# Patient Record
Sex: FEMALE | Race: BLACK OR AFRICAN AMERICAN | NOT HISPANIC OR LATINO | Employment: UNEMPLOYED | URBAN - METROPOLITAN AREA
[De-identification: names, ages, dates, MRNs, and addresses within clinical notes are randomized per-mention and may not be internally consistent; named-entity substitution may affect disease eponyms.]

---
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
## 2024-01-02 NOTE — PATIENT INSTRUCTIONS
If you have any questions regarding your visit, Please contact your care team.     [facility]: [phone]  To Schedule an Appointment 24/7  Call: [phone]     Yvette Zendejas- APRN CNP      [facility]-Surgery Scheduler  [facility]-Surgery Scheduler         Monday 7:30am-2:00pm    Tuesday 7:30am-4:00pm    Wednesday 7:30am-2:00pm    Thursday 7:30am-11:00am    Friday 7:30am-2:00pm [address] [address]  Phone- [phone]   Fax- [phone]     Imaging Scheduling all locations  [phone]    [facility] Labor and Delivery  [address] [place] [address]   [place] [address] [phone]         Urgent Care locations:  [facility]   Monday-Friday  10 am - 8 pm  Saturday and Sunday   9 am - 5 pm     [phone]   If you need a medication refill, please contact your pharmacy. Please allow 3 business days for your refill to be completed.  As always, Thank you for trusting us with your healthcare needs!      see additional instructions from your care team below

## 2024-01-03 ENCOUNTER — OFFICE VISIT (OUTPATIENT)
Dept: OBGYN | Facility: CLINIC | Age: 19
End: 2024-01-03
Payer: COMMERCIAL

## 2024-01-03 VITALS
DIASTOLIC BLOOD PRESSURE: 78 MMHG | SYSTOLIC BLOOD PRESSURE: 123 MMHG | OXYGEN SATURATION: 96 % | HEART RATE: 82 BPM | WEIGHT: 169.2 LBS | BODY MASS INDEX: 28.16 KG/M2

## 2024-01-03 DIAGNOSIS — N89.8 VAGINAL DISCHARGE: Primary | ICD-10-CM

## 2024-01-03 DIAGNOSIS — B96.89 BV (BACTERIAL VAGINOSIS): ICD-10-CM

## 2024-01-03 DIAGNOSIS — N76.0 BV (BACTERIAL VAGINOSIS): ICD-10-CM

## 2024-01-03 DIAGNOSIS — Z30.011 ENCOUNTER FOR INITIAL PRESCRIPTION OF CONTRACEPTIVE PILLS: ICD-10-CM

## 2024-01-03 DIAGNOSIS — B00.9 HSV INFECTION: ICD-10-CM

## 2024-01-03 DIAGNOSIS — Z11.3 SCREEN FOR STD (SEXUALLY TRANSMITTED DISEASE): ICD-10-CM

## 2024-01-03 LAB
C TRACH DNA SPEC QL NAA+PROBE: NEGATIVE
CLUE CELLS: PRESENT
HBV SURFACE AG SERPL QL IA: NONREACTIVE
HCV AB SERPL QL IA: NONREACTIVE
HIV 1+2 AB+HIV1 P24 AG SERPL QL IA: NONREACTIVE
N GONORRHOEA DNA SPEC QL NAA+PROBE: NEGATIVE
T PALLIDUM AB SER QL: NONREACTIVE
TRICHOMONAS, WET PREP: ABNORMAL
WBC'S/HIGH POWER FIELD, WET PREP: ABNORMAL
YEAST, WET PREP: ABNORMAL

## 2024-01-03 PROCEDURE — 87340 HEPATITIS B SURFACE AG IA: CPT | Performed by: NURSE PRACTITIONER

## 2024-01-03 PROCEDURE — 86803 HEPATITIS C AB TEST: CPT | Performed by: NURSE PRACTITIONER

## 2024-01-03 PROCEDURE — 87210 SMEAR WET MOUNT SALINE/INK: CPT | Performed by: NURSE PRACTITIONER

## 2024-01-03 PROCEDURE — 36415 COLL VENOUS BLD VENIPUNCTURE: CPT | Performed by: NURSE PRACTITIONER

## 2024-01-03 PROCEDURE — 87491 CHLMYD TRACH DNA AMP PROBE: CPT | Performed by: NURSE PRACTITIONER

## 2024-01-03 PROCEDURE — 87591 N.GONORRHOEAE DNA AMP PROB: CPT | Performed by: NURSE PRACTITIONER

## 2024-01-03 PROCEDURE — 86780 TREPONEMA PALLIDUM: CPT | Performed by: NURSE PRACTITIONER

## 2024-01-03 PROCEDURE — 99214 OFFICE O/P EST MOD 30 MIN: CPT | Performed by: NURSE PRACTITIONER

## 2024-01-03 PROCEDURE — 87389 HIV-1 AG W/HIV-1&-2 AB AG IA: CPT | Performed by: NURSE PRACTITIONER

## 2024-01-03 RX ORDER — VALACYCLOVIR HYDROCHLORIDE 1 G/1
2000 TABLET, FILM COATED ORAL 2 TIMES DAILY PRN
Qty: 20 TABLET | Refills: 0 | Status: SHIPPED | OUTPATIENT
Start: 2024-01-03 | End: 2024-02-27

## 2024-01-03 RX ORDER — DESOGESTREL AND ETHINYL ESTRADIOL 0.15-0.03
1 KIT ORAL DAILY
Qty: 84 TABLET | Refills: 3 | Status: SHIPPED | OUTPATIENT
Start: 2024-01-03 | End: 2024-05-06 | Stop reason: SINTOL

## 2024-01-03 RX ORDER — METRONIDAZOLE 7.5 MG/G
1 GEL VAGINAL AT BEDTIME
Qty: 70 G | Refills: 0 | Status: SHIPPED | OUTPATIENT
Start: 2024-01-03 | End: 2024-01-08

## 2024-01-03 NOTE — PROGRESS NOTES
Assessment & Plan     Vaginal discharge  - Chlamydia trachomatis PCR  - Neisseria gonorrhoeae PCR  - Wet preparation    Screen for STD (sexually transmitted disease)  Await results and will treat as indicated.   - Chlamydia trachomatis PCR  - Neisseria gonorrhoeae PCR  - Hepatitis B surface antigen; Future  - Hepatitis C Screen Reflex to HCV RNA Quant and Genotype; Future  - Treponema Abs w Reflex to RPR and Titer; Future  - HIV Antigen Antibody Combo Cascade; Future  - Hepatitis B surface antigen  - Hepatitis C Screen Reflex to HCV RNA Quant and Genotype  - Treponema Abs w Reflex to RPR and Titer  - HIV Antigen Antibody Combo Cascade    Encounter for initial prescription of contraceptive pills  Discussed options for contraception with patient including oral contraceptive pills, transdermal patches, vaginal ring, Depo Provera, Nexplanon, IUD. At this time she would like to try an oral contraceptive pill. We discussed when to start the pill, taking it at the same time every day, possible side effects she may experience, and use of barrier method to protect against STDs.   - desogestrel-ethinyl estradiol (APRI) 0.15-30 MG-MCG tablet; Take 1 tablet by mouth daily    HSV infection  Reviewed dosing instructions for the Valtrex for oral outbreaks, patient did not read and took all 20 tablets. We discussed use, also reviewed outbreak symptoms. Will send new prescription and she verbalizes understanding of dosing instructions for outbreaks.  - valACYclovir (VALTREX) 1000 mg tablet; Take 2 tablets (2,000 mg) by mouth 2 times daily as needed (oral outbreaks PRN for 1 day)    BV (bacterial vaginosis)  Preferred MetroGel for treatment, discussed use. Recommend no intercourse for 7-10 days. If right lower quadrant pain persists after treatment, to send message and I will order a pelvic ultrasound. Patient is given an opportunity to ask questions and have them answered.  - metroNIDAZOLE (METROGEL) 0.75 % vaginal gel; Place 1  applicator (5 g) vaginally at bedtime for 5 days  {  PAMELLA Nguyen CNP  [facility] DAVID Ramos is a 18 year old, presenting for the following health issues:  Vaginal Problem      HPI     Presents today with several concerns. Patient was seen by OB in September for right lower quadrant pelvic pain, vaginal discharge. STI screening was positive for HSV 1 and 2, also BV and patient was treated. Plan was to consider pelvic ultrasound if symptoms persisted. Since then right lower quadrant pain has been intermittent. Can have long periods of time with no symptoms, then gets a sudden striking pain. Lasts a few minutes and spontaneously resolves. No notable pattern, no specific aggravating factors. Does not occur daily, can occur more than once in a day when it does happen. Notes increase recently in vaginal discharge, odor and irritation. No itching, abnormal urinary symptoms.  Denies fever, nausea, dizziness. Some decrease in appetite.   Patient was diagnosed in September with HSV type 1 and 2 by lab work. Since then, feels she has had several oral outbreaks-describes as cracks in the corner of her mouth, no noted lesion. Patient was given prescription for herpes labialis and is requesting refill-though was given enough tablets for several outbreaks.   She would like to discuss contraception at this time as well. Had been open to a pregnancy if it occurred, but would like to discuss options at this time, has not used contraception previously. Cycles can be a little irregular since her termination in 2022, last one was later in the month instead of earlier. No contraindications to use of hormonal medication.     Review of Systems   Constitutional, HEENT, cardiovascular, pulmonary, gi and gu systems are negative, except as otherwise noted.      Objective    /78 (BP Location: Right arm, Patient Position: Chair, Cuff Size: Adult Regular)   Pulse 82   Wt 76.7 kg (169 lb  3.2 oz)   LMP 12/20/2023 (Exact Date)   SpO2 96%   BMI 28.16 kg/m    Body mass index is 28.16 kg/m .  Physical Exam   GENERAL: healthy, alert and no distress  ABDOMEN: soft, nontender, without hepatosplenomegaly or masses. No guarding, rebound tenderness or rigidity   (female): normal female external genitalia, normal urethral meatus , vaginal mucosa pink, moist, well rugated, vaginal discharge - copious and yellow, and normal cervix, adnexae, and uterus without masses.  MS: no gross musculoskeletal defects noted, no edema  SKIN: no suspicious lesions or rashes  PSYCH: mentation appears normal, affect normal/bright    Results for orders placed or performed in visit on 01/03/24 (from the past 24 hour(s))   Wet preparation    Specimen: Vagina; Swab   Result Value Ref Range    Trichomonas Absent Absent    Yeast Absent Absent    Clue Cells Present (A) Absent    WBCs/high power field 1+ (A) None

## 2024-01-24 ENCOUNTER — MYC MEDICAL ADVICE (OUTPATIENT)
Dept: FAMILY MEDICINE | Facility: CLINIC | Age: 19
End: 2024-01-24
Payer: COMMERCIAL

## 2024-01-24 ENCOUNTER — MYC REFILL (OUTPATIENT)
Dept: OBGYN | Facility: CLINIC | Age: 19
End: 2024-01-24
Payer: COMMERCIAL

## 2024-01-24 DIAGNOSIS — B00.9 HSV INFECTION: ICD-10-CM

## 2024-01-24 RX ORDER — VALACYCLOVIR HYDROCHLORIDE 500 MG/1
500 TABLET, FILM COATED ORAL DAILY
Qty: 90 TABLET | Refills: 3 | OUTPATIENT
Start: 2024-01-24

## 2024-01-24 NOTE — TELEPHONE ENCOUNTER
"Requested Prescriptions   Pending Prescriptions Disp Refills    valACYclovir (VALTREX) 500 MG tablet 90 tablet 3     Sig: Take 1 tablet (500 mg) by mouth daily .       Antivirals for Herpes Protocol Failed - 1/24/2024  5:48 AM        Failed - Normal serum creatinine on file in past 12 months     Recent Labs   Lab Test 05/02/21  1240   CR 0.87       Ok to refill medication if creatinine is low          Passed - Patient is age 12 or older        Passed - Recent (12 mo) or future (30 days) visit within the authorizing provider's specialty     The patient must have completed an in-person or virtual visit within the past 12 months or has a future visit scheduled within the next 90 days with the authorizing provider s specialty.  Urgent care and e-visits do not quality as an office visit for this protocol.          Passed - Medication is active on med list           Last Written Prescription Date:  11/14/23  Last Fill Quantity: 90,  # refills: 3   Last office visit: 9/12/2023 ; last virtual visit: Visit date not found with prescribing provider:  Phong   Future Office Visit:  NONE    Refill request refused due to :   Refills available at pharmacy.  Request sent back to pharmacy as \"duplicate\"    Kelli Gamboa RN on 1/24/2024 at 5:55 AM              "

## 2024-01-26 RX ORDER — VALACYCLOVIR HYDROCHLORIDE 500 MG/1
500 TABLET, FILM COATED ORAL DAILY
Qty: 90 TABLET | Refills: 3 | Status: CANCELLED | OUTPATIENT
Start: 2024-01-26

## 2024-01-28 ENCOUNTER — MYC REFILL (OUTPATIENT)
Dept: OBGYN | Facility: CLINIC | Age: 19
End: 2024-01-28
Payer: COMMERCIAL

## 2024-01-28 DIAGNOSIS — B00.9 HSV INFECTION: ICD-10-CM

## 2024-01-29 RX ORDER — VALACYCLOVIR HYDROCHLORIDE 500 MG/1
500 TABLET, FILM COATED ORAL DAILY
Qty: 90 TABLET | Refills: 3 | OUTPATIENT
Start: 2024-01-29

## 2024-01-29 NOTE — TELEPHONE ENCOUNTER
"Requested Prescriptions   Pending Prescriptions Disp Refills    valACYclovir (VALTREX) 500 MG tablet 90 tablet 3     Sig: Take 1 tablet (500 mg) by mouth daily .       Antivirals for Herpes Protocol Failed - 1/28/2024  4:26 PM        Failed - Normal serum creatinine on file in past 12 months     Recent Labs   Lab Test 05/02/21  1240   CR 0.87       Ok to refill medication if creatinine is low          Passed - Patient is age 12 or older        Passed - Recent (12 mo) or future (30 days) visit within the authorizing provider's specialty     The patient must have completed an in-person or virtual visit within the past 12 months or has a future visit scheduled within the next 90 days with the authorizing provider s specialty.  Urgent care and e-visits do not quality as an office visit for this protocol.          Passed - Medication is active on med list           Last Written Prescription Date:  11/14/23  Last Fill Quantity: 90,  # refills: 3   Last office visit: 9/12/2023 ; last virtual visit: Visit date not found with prescribing provider:  Phong   Future Office Visit:  NONE    Refill request refused due to :   Refills available at pharmacy.  Request sent back to pharmacy as \"duplicate\"  Kelli Gamboa RN on 1/29/2024 at 6:38 AM            "

## 2024-02-09 ENCOUNTER — MYC REFILL (OUTPATIENT)
Dept: OBGYN | Facility: CLINIC | Age: 19
End: 2024-02-09
Payer: COMMERCIAL

## 2024-02-09 DIAGNOSIS — B00.9 HSV INFECTION: ICD-10-CM

## 2024-02-09 RX ORDER — VALACYCLOVIR HYDROCHLORIDE 500 MG/1
500 TABLET, FILM COATED ORAL DAILY
Qty: 90 TABLET | Refills: 3 | Status: CANCELLED | OUTPATIENT
Start: 2024-02-09

## 2024-02-09 NOTE — TELEPHONE ENCOUNTER
Requested Prescriptions   Pending Prescriptions Disp Refills    valACYclovir (VALTREX) 500 MG tablet 90 tablet 3     Sig: Take 1 tablet (500 mg) by mouth daily .       Antivirals for Herpes Protocol Failed - 2/9/2024 12:33 PM        Failed - Normal serum creatinine on file in past 12 months     Recent Labs   Lab Test 05/02/21  1240   CR 0.87       Ok to refill medication if creatinine is low          Passed - Patient is age 12 or older        Passed - Recent (12 mo) or future (30 days) visit within the authorizing provider's specialty     The patient must have completed an in-person or virtual visit within the past 12 months or has a future visit scheduled within the next 90 days with the authorizing provider s specialty.  Urgent care and e-visits do not quality as an office visit for this protocol.          Passed - Medication is active on med list           Last Written Prescription Date:  11/14/23  Last Fill Quantity: 90,  # refills: 3   Last office visit: 9/12/2023 ; last virtual visit: Visit date not found with prescribing provider:  Radha Darling NP   Future Office Visit:        Refused as duplicate    Radha Herman RN on 2/9/2024 at 12:51 PM

## 2024-02-16 ENCOUNTER — MYC REFILL (OUTPATIENT)
Dept: OBGYN | Facility: CLINIC | Age: 19
End: 2024-02-16
Payer: COMMERCIAL

## 2024-02-16 DIAGNOSIS — B00.9 HSV INFECTION: ICD-10-CM

## 2024-02-19 RX ORDER — VALACYCLOVIR HYDROCHLORIDE 500 MG/1
500 TABLET, FILM COATED ORAL DAILY
Qty: 90 TABLET | Refills: 3 | OUTPATIENT
Start: 2024-02-19

## 2024-02-19 NOTE — TELEPHONE ENCOUNTER
Requested Prescriptions   Pending Prescriptions Disp Refills    valACYclovir (VALTREX) 500 MG tablet 90 tablet 3     Sig: Take 1 tablet (500 mg) by mouth daily .       Antivirals for Herpes Protocol Failed - 2/16/2024  7:58 PM        Failed - Normal serum creatinine on file in past 12 months     Recent Labs   Lab Test 05/02/21  1240   CR 0.87       Ok to refill medication if creatinine is low          Passed - Patient is age 12 or older        Passed - Recent (12 mo) or future (30 days) visit within the authorizing provider's specialty     The patient must have completed an in-person or virtual visit within the past 12 months or has a future visit scheduled within the next 90 days with the authorizing provider s specialty.  Urgent care and e-visits do not quality as an office visit for this protocol.          Passed - Medication is active on med list           Refills available  Adry Gaines RN on 2/19/2024 at 11:11 AM

## 2024-02-20 ENCOUNTER — TELEPHONE (OUTPATIENT)
Dept: FAMILY MEDICINE | Facility: CLINIC | Age: 19
End: 2024-02-20
Payer: COMMERCIAL

## 2024-02-20 DIAGNOSIS — B00.9 HSV INFECTION: ICD-10-CM

## 2024-02-22 NOTE — TELEPHONE ENCOUNTER
500mg has refills on file. 1000mg pended in separate encounter and sent to PCP.     SUE BobbyN, RN

## 2024-02-27 ENCOUNTER — MYC REFILL (OUTPATIENT)
Dept: OBGYN | Facility: CLINIC | Age: 19
End: 2024-02-27
Payer: COMMERCIAL

## 2024-02-27 DIAGNOSIS — B00.9 HSV INFECTION: ICD-10-CM

## 2024-02-27 RX ORDER — VALACYCLOVIR HYDROCHLORIDE 1 G/1
2000 TABLET, FILM COATED ORAL 2 TIMES DAILY PRN
Qty: 20 TABLET | Refills: 0 | Status: SHIPPED | OUTPATIENT
Start: 2024-02-27 | End: 2024-04-10

## 2024-02-27 NOTE — TELEPHONE ENCOUNTER
valACYclovir (VALTREX) 1000 mg tablet was prescribed for pt to take for oral outbreaks PRN for one day on 1/3/24.  Pt was given #20 pills and was advised to take 2 tablets daily BID (4 pills a day).    Pt clarified that she has already gone through #20 tablets since they were prescribed in January.  She states she hasn't been taking the daily Valtrex.  I see that she should have plenty of refills at her pharmacy for the Valtrex 500 mg so I advised that she pick these up and start taking so she doesn't have so many outbreaks.    Routing to Yvette to approve the Valtrex 1000 mg if appropriate.    Regine Gonzalez RN

## 2024-02-27 NOTE — TELEPHONE ENCOUNTER
Refill sent to use for outbreaks. Will need to be seen if she continues to have frequent outbreaks. Yvette CAN CNP

## 2024-04-08 DIAGNOSIS — B00.9 HSV INFECTION: ICD-10-CM

## 2024-04-08 NOTE — TELEPHONE ENCOUNTER
valACYclovir (VALTREX) 1000 mg tablet was prescribed for pt on 2/27/24 to take 2 tablets BID as needed for breakouts (only take PRN for 1 day).      Sending pt a [employer] message to see if she has already had 5 outbreaks since 2/27.    Regine Gonzalez RN

## 2024-04-10 ENCOUNTER — NURSE TRIAGE (OUTPATIENT)
Dept: FAMILY MEDICINE | Facility: CLINIC | Age: 19
End: 2024-04-10
Payer: COMMERCIAL

## 2024-04-10 RX ORDER — VALACYCLOVIR HYDROCHLORIDE 1 G/1
TABLET, FILM COATED ORAL
Qty: 28 TABLET | Refills: 0 | Status: SHIPPED | OUTPATIENT
Start: 2024-04-10 | End: 2024-05-06

## 2024-04-10 NOTE — TELEPHONE ENCOUNTER
Attempted to reach patient to further triage. Left voice message to return call at [phone].    Please try again.    [employer] message also sent.    SUE PlataN RN  [facility]

## 2024-04-10 NOTE — TELEPHONE ENCOUNTER
Pt has not read the [employer] message sent to her.    Spoke with pt to see how she is taking the Valtrex 1000 mg tablets.  She states she is completely out of the #20 pills she was prescribed for oral outbreaks.  She states she has oral and vaginal outbreaks about every 3 weeks.  She is quite confused regarding how she is supposed to take the Valtrex 1000 mg based on if she has an oral outbreak as opposed to having a vaginal outbreak.      Pt is also prescribed Valtrex 500 mg tablets to take daily.  She states she has not taken this in about a month because she is out.  I explained to her that PAMELLA Grant CNP, gave her enough refills to last her until November, 2024, and she needs to contact her pharmacy for more refills.  Pt was quite confused about this as well.  I explained to her that she needs to contact her pharmacy to get a refill of the Valtrex 500 mg and keep taking one pill every day to help prevent outbreaks.  Pt will do this.    Pt is still requesting a refill of the Valtrex 1000 mg as she is currently experiencing an oral outbreak.  She is requesting a larger amount than only 20 as she will be out of town.    Routing to PAMELLA Burrows CNP.    Regine Gonzalez RN

## 2024-04-10 NOTE — TELEPHONE ENCOUNTER
Spoke with pt and let her know that a refill of the Valtrex 1000mg has been sent to her pharmacy.  I explained in a few different ways how to take this medication and that it is only for outbreaks and to only take for one day.  I also encourage her again to fill the Valtrex 500 mg tablets and start taking one pill every day to help suppress her outbreaks.   Pt states that she did contact her pharmacy and filled the Valtrex 500 mg so she will pick those up when she picks up the Valtrex 1000 mg tablets.  She will take the Valtrex 500 mg once daily starting today.  She will also take the Valtrex 28353 mg tablets, 2 pills twice today for a current outbreak.    I also encouraged her to read the instructions on each bottle of pills often to help her remind herself how to take them.    Pt verbalized understanding and agreed to plan.    Regine Gonzalez RN

## 2024-04-10 NOTE — TELEPHONE ENCOUNTER
"Nurse Triage SBAR    Is this a 2nd Level Triage? YES, LICENSED PRACTITIONER REVIEW IS REQUIRED    Situation: patient stated that she is bipolar and is having suicidal thoughts,  she does not have a plan, and has not had any thoughts today. But stated that they can be fleeting thoughts throughout the day.     Background: see above    Assessment:     1. CONCERN: \"What happened that made you call today?\" \"What is your main question or concern about suicide (or depression)?\"      I made an appointment for tomorrow with Cheryl, someone called and said to call them back.      2. RISK OF HARM - SUICIDAL ATTEMPT: \"Have you tried to hurt yourself recently?\" If yes, \"When was this?\"      Denies    3. RISK OF HARM - SUICIDAL IDEATION: In the past week, \"Did you have thoughts of hurting or killing yourself?\" (e.g., yes, no, no but preoccupation with thoughts about death)  - WISH TO BE DEAD: \"Have you ever wished you were dead or wished you could go to sleep and not wake up?\" \"Have you felt that you or your family would be better off if you were dead?\"  - INTENT: \" Have you had any thoughts of hurting or killing yourself? (e.g., yes, no, N/A) If yes: \"Are you having these thoughts about killing yourself right NOW?\"    I do not have these thoughts today,  they can happen all day, or just at random, on certain other days.    - PLAN: If yes: \"Have you thought about how you might do this? Do you have a specific plan in mind?\" (e.g., gun, knife, overdose, hanging, no plan)    I do not have a plan, and would never really hurt myself        4. RISK OF HARM - SUICIDAL BEHAVIOR: \"Have you ever done anything, started to do anything or prepared to do anything to end your life?\" (collected pills, access to a gun, wrote a note, cut yourself, etc)      no  5. FIREARMS: \"Do you have any guns in your home?\"      no  6. ONSET: \"When did the suicidal behavior (or depression) begin?\"      When I was 12 years old, off and on.    7. EVENTS AND " "STRESSORS: \"Has there been any new stress or recent changes in your life?\" (e.g., recent loss of loved one, negative event, etc)      Not really    8. FUNCTIONAL IMPAIRMENT: \"How have things been going for you overall?  Have you had any more difficulties than usual doing your normal daily activities?\" (e.g., better, same, worse; self-care, school, work, interactions)      Not really, I have always felt this way, however it is happening more often.    9. RECURRENT SYMPTOMS: \"Have you ever done this before?\" If so, ask: \"When was the last time?\" and \"What happened that time?\"      Yes, I have not tried to hurt myself, but I feel like I could off and on.  I would never do it though.  I do have bipolar disorder.    10. THERAPIST: \"Do you (or your teen) have a counselor or therapist? Name?\"        Have not seen one in a while.    Advised patient of below:    If you develop active thoughts of self harm and have a plan to harm yourself and means to carry out that plan or you feel that you are in a mental health crisis, please go to the nearest emergency room for immediate care. They have mental health teams that can help you.      RN recommended ER or EMPATH unit for resources while waiting for provider appointment.   ([facility]'s mental health emergency area at [facility] called Em[facility] ([address] [place] [address]  [phone])   Patient verbalizes acknowledgement.         Below are crisis hotline numbers to keep on hand and use if needed.  Here are some support lines you can call if you are having any thoughts of harming yourself.   Suicide and Crisis Lifeline: Call or text 946  National Hotline for Mental Health Crisis and Suicide Prevention 6-596-839-TALK (3827)  [facility] 24-Hour Crisis Counseling (for [facility] Residents Only) [phone]   [facility] 24/7 Suicide Crisis Hotline 180-082-4764   [facility] 24/7 Crisis Intervention Center- Suicide Hotline 747-709-4009, " Crisis Referral Line 634-115-1847      Per protocol:    Patient is to be seen today.    Routed to PCP to advise as level 2 triage.    Protocol Recommended Disposition:   See in Office Today    Recommendation: frank; 2 triage to further advised on disposition    Routed to provider    .    Viry ROGERSN RN  Triage Nurse  [facility]

## 2024-04-10 NOTE — TELEPHONE ENCOUNTER
"Reason for Disposition    Patient reveals suicidal thoughts but no suicidal plans or threats and caller feels patient is safe    Additional Information    Negative: Patient has attempted suicide and has major injuries or serious overdose     Advised to call 911 if these symptoms occur.  Patient stated understanding and agreeable with the plan of care.    Negative: Patient is threatening suicide and has a deadly weapon (e.g., firearm, knife)     Advised to call 911 if these symptoms occur.  Patient stated understanding and agreeable with the plan of care.    Negative: Suicide attempt in progress now and can't be stopped     Advised to call 911 if these symptoms occur.  Patient stated understanding and agreeable with the plan of care.    Negative: Patient is threatening suicide now and unwilling to go to ER or combative    Negative: Seeing, hearing or feeling things that are not there     Advised to call 911 if these symptoms occur.  Patient stated understanding and agreeable with the plan of care.    Negative: Caller expresses suicidal thoughts and hangs up and triager unable to complete triage    Negative: Patient has attempted suicide today and has minor injuries or overdose     Advised to ER if these symptoms occur.  Patient stated understanding and agreeable with the plan of care.    Negative: Patient is threatening suicide now and willing to go to ER    Negative: Patient not threatening suicide now but revealed a suicide plan (eg. overdose, gunshot)    Negative: Patient is extremely upset (e.g. can't be calmed down)    Answer Assessment - Initial Assessment Questions  1. CONCERN: \"What happened that made you call today?\" \"What is your main question or concern about suicide (or depression)?\"      I made an appointment for tomorrow with Cheryl, someone called and said to call them back.      2. RISK OF HARM - SUICIDAL ATTEMPT: \"Have you tried to hurt yourself recently?\" If yes, \"When was this?\"      Denies    3. RISK " "OF HARM - SUICIDAL IDEATION: In the past week, \"Did you have thoughts of hurting or killing yourself?\" (e.g., yes, no, no but preoccupation with thoughts about death)  - WISH TO BE DEAD: \"Have you ever wished you were dead or wished you could go to sleep and not wake up?\" \"Have you felt that you or your family would be better off if you were dead?\"  - INTENT: \" Have you had any thoughts of hurting or killing yourself? (e.g., yes, no, N/A) If yes: \"Are you having these thoughts about killing yourself right NOW?\"    I do not have these thoughts today,  they can happen all day, or just at random, on certain other days.    - PLAN: If yes: \"Have you thought about how you might do this? Do you have a specific plan in mind?\" (e.g., gun, knife, overdose, hanging, no plan)    I do not have a plan, and would never really hurt myself        4. RISK OF HARM - SUICIDAL BEHAVIOR: \"Have you ever done anything, started to do anything or prepared to do anything to end your life?\" (collected pills, access to a gun, wrote a note, cut yourself, etc)      no  5. FIREARMS: \"Do you have any guns in your home?\"      no  6. ONSET: \"When did the suicidal behavior (or depression) begin?\"      When I was 12 years old, off and on.    7. EVENTS AND STRESSORS: \"Has there been any new stress or recent changes in your life?\" (e.g., recent loss of loved one, negative event, etc)      Not really    8. FUNCTIONAL IMPAIRMENT: \"How have things been going for you overall?  Have you had any more difficulties than usual doing your normal daily activities?\" (e.g., better, same, worse; self-care, school, work, interactions)      Not really, I have always felt this way, however it is happening more often.    9. RECURRENT SYMPTOMS: \"Have you ever done this before?\" If so, ask: \"When was the last time?\" and \"What happened that time?\"      Yes, I have not tried to hurt myself, but I feel like I could off and on.  I would never do it though.  I do have bipolar " "disorder.    10. THERAPIST: \"Do you (or your teen) have a counselor or therapist? Name?\"        Have not seen one in a while.    Advised patient of below:    If you develop active thoughts of self harm and have a plan to harm yourself and means to carry out that plan or you feel that you are in a mental health crisis, please go to the nearest emergency room for immediate care. They have mental health teams that can help you.      RN recommended ER or EMPATH unit for resources while waiting for provider appointment.   ([facility]'s mental health emergency area at [facility] called [facility] ([address] [place] [address]  [phone])   Patient verbalizes acknowledgement.         Below are crisis hotline numbers to keep on hand and use if needed.  Here are some support lines you can call if you are having any thoughts of harming yourself.   Suicide and Crisis Lifeline: Call or text 126  [place] Hotline for Mental Health Crisis and Suicide Prevention 0-862-416-TALK (7046)  [facility] 24-Hour Crisis Counseling (for [facility] Residents Only) [phone]   [facility] 24/7 Suicide Crisis Hotline 854-088-8488   [facility] 24/7 Crisis Intervention Center- Suicide Hotline 842-551-2707, Crisis Referral Line [phone]      Per protocol:    Patient is to be seen today.    Routed to PCP to advise as level 2 triage.    Viry ARIZMENDI RN  Triage Nurse  [facility]    Protocols used: Suicide [id number]    "

## 2024-04-10 NOTE — TELEPHONE ENCOUNTER
I would recommend the Empath unit. It patient feels stable until appointment tomorrow ok to keep, however it sounds like she is acutely worsening and needs additional help.   Cheryl Morgan PA-C

## 2024-04-10 NOTE — TELEPHONE ENCOUNTER
Patient with appointment tomorrow, noting suicidal thoughts and not being ok mentally. Please triage patient for safety for this appointment.   Cheryl Morgan PA-C

## 2024-04-10 NOTE — TELEPHONE ENCOUNTER
Additional prescription sent for the PRN use of Valtrex for oral outbreaks. As instructed by RN, should refill and restart daily suppressive treatment as this may also help with the frequency of outbreaks. Yvette CAN CNP

## 2024-04-10 NOTE — TELEPHONE ENCOUNTER
Routing to PCP as FYI    Patient will keep appointment.      RN called and spoke with patient.    RN advised on Empath unit at [facility]    Patient stated she was fine now and did what she needed to do as it took RN time to get back to patient.  Patient stated she is on her way to court currently and court takes a long time.    RN advised patient to keep appointment tomorrow and to call the clinic back if further concerns or changes.    Patient verbalized understanding.    Sixto Jha, RN, BSN, PHN  [facility]

## 2024-04-11 NOTE — TELEPHONE ENCOUNTER
Called patient.  She stated that she was up til 0400 and is dealing with some court issues.  She overslept and missed her appointment today with RONAL Mastesron   Offered Empath again but patient stated that she is feeling fine today.  She would just like to reschedule.    Appointment scheduled with RONAL Masterson on 4/16/2024  Patient agreed to consider Empath if needed    Destiney Felix RN  [facility]

## 2024-04-11 NOTE — TELEPHONE ENCOUNTER
Patient was a no show for her appointment today. Please call and check on patient. If no response please initiate a welfare check.   Cheryl Morgan PA-C

## 2024-04-16 ENCOUNTER — TELEPHONE (OUTPATIENT)
Dept: FAMILY MEDICINE | Facility: CLINIC | Age: 19
End: 2024-04-16

## 2024-04-16 ENCOUNTER — OFFICE VISIT (OUTPATIENT)
Dept: URGENT CARE | Facility: URGENT CARE | Age: 19
End: 2024-04-16
Payer: COMMERCIAL

## 2024-04-16 VITALS
DIASTOLIC BLOOD PRESSURE: 72 MMHG | SYSTOLIC BLOOD PRESSURE: 116 MMHG | RESPIRATION RATE: 12 BRPM | OXYGEN SATURATION: 99 % | TEMPERATURE: 98.7 F | HEART RATE: 82 BPM

## 2024-04-16 DIAGNOSIS — Z11.3 SCREENING EXAMINATION FOR STI: ICD-10-CM

## 2024-04-16 DIAGNOSIS — F32.A DEPRESSION, UNSPECIFIED DEPRESSION TYPE: ICD-10-CM

## 2024-04-16 DIAGNOSIS — B96.89 BACTERIAL VAGINOSIS: Primary | ICD-10-CM

## 2024-04-16 DIAGNOSIS — N76.0 BACTERIAL VAGINOSIS: Primary | ICD-10-CM

## 2024-04-16 LAB
ALBUMIN UR-MCNC: NEGATIVE MG/DL
APPEARANCE UR: ABNORMAL
BACTERIA #/AREA URNS HPF: ABNORMAL /HPF
BILIRUB UR QL STRIP: ABNORMAL
CLUE CELLS: PRESENT
COLOR UR AUTO: YELLOW
GLUCOSE UR STRIP-MCNC: NEGATIVE MG/DL
HGB UR QL STRIP: ABNORMAL
KETONES UR STRIP-MCNC: NEGATIVE MG/DL
LEUKOCYTE ESTERASE UR QL STRIP: NEGATIVE
MUCOUS THREADS #/AREA URNS LPF: PRESENT /LPF
NITRATE UR QL: NEGATIVE
PH UR STRIP: 6 [PH] (ref 5–7)
RBC #/AREA URNS AUTO: ABNORMAL /HPF
SP GR UR STRIP: 1.02 (ref 1–1.03)
SQUAMOUS #/AREA URNS AUTO: ABNORMAL /LPF
T PALLIDUM AB SER QL: NONREACTIVE
TRICHOMONAS, WET PREP: ABNORMAL
UROBILINOGEN UR STRIP-ACNC: 0.2 E.U./DL
WBC #/AREA URNS AUTO: ABNORMAL /HPF
WBC'S/HIGH POWER FIELD, WET PREP: ABNORMAL
YEAST, WET PREP: ABNORMAL

## 2024-04-16 PROCEDURE — 99214 OFFICE O/P EST MOD 30 MIN: CPT | Performed by: PHYSICIAN ASSISTANT

## 2024-04-16 PROCEDURE — 87389 HIV-1 AG W/HIV-1&-2 AB AG IA: CPT | Performed by: PHYSICIAN ASSISTANT

## 2024-04-16 PROCEDURE — 81001 URINALYSIS AUTO W/SCOPE: CPT | Performed by: PHYSICIAN ASSISTANT

## 2024-04-16 PROCEDURE — 86706 HEP B SURFACE ANTIBODY: CPT | Performed by: PHYSICIAN ASSISTANT

## 2024-04-16 PROCEDURE — 87340 HEPATITIS B SURFACE AG IA: CPT | Performed by: PHYSICIAN ASSISTANT

## 2024-04-16 PROCEDURE — 87210 SMEAR WET MOUNT SALINE/INK: CPT | Performed by: PHYSICIAN ASSISTANT

## 2024-04-16 PROCEDURE — 36415 COLL VENOUS BLD VENIPUNCTURE: CPT | Performed by: PHYSICIAN ASSISTANT

## 2024-04-16 PROCEDURE — 86780 TREPONEMA PALLIDUM: CPT | Performed by: PHYSICIAN ASSISTANT

## 2024-04-16 PROCEDURE — 86803 HEPATITIS C AB TEST: CPT | Performed by: PHYSICIAN ASSISTANT

## 2024-04-16 PROCEDURE — 87491 CHLMYD TRACH DNA AMP PROBE: CPT | Performed by: PHYSICIAN ASSISTANT

## 2024-04-16 PROCEDURE — 87591 N.GONORRHOEAE DNA AMP PROB: CPT | Performed by: PHYSICIAN ASSISTANT

## 2024-04-16 RX ORDER — METRONIDAZOLE 500 MG/1
500 TABLET ORAL 2 TIMES DAILY
Qty: 14 TABLET | Refills: 0 | Status: SHIPPED | OUTPATIENT
Start: 2024-04-16 | End: 2024-04-23

## 2024-04-16 NOTE — TELEPHONE ENCOUNTER
Please call and check on patient. She missed her appointment again today and chief complaint continues to be suicidal thoughts.   Cheryl Morgan PA-C

## 2024-04-16 NOTE — TELEPHONE ENCOUNTER
"Called pt and she said, \"If you're calling to do a safety check, I am fine.\" Pt denies thoughts of suicide. No plan. RN advised pt to go to ER if any thoughts of suicide or a plan. Pt verbalized understanding. RN assisted with rescheduling appt with PCP for 4/18.    Deborah Valenzuela RN  [facility]    "

## 2024-04-16 NOTE — PROGRESS NOTES
Patient presents with:  STD: Patient here with concerns of possible STD's. States she is currently having her period but states that prior to it starting she was noticing a discharge. States she is positive for Herpes Simplex 1 and 2 and does take medications for that. States she wants to be tested for any STD's she can today.   Also mentions stye on her right eyelid that is bothering her.     (N76.0,  B96.89) Bacterial vaginosis  (primary encounter diagnosis)  Comment:   Plan: metroNIDAZOLE (FLAGYL) 500 MG tablet            (Z11.3) Screening examination for STI  Comment:   Plan: Chlamydia trachomatis/Neisseria gonorrhoeae by         PCR - Clinic Collect, HIV Antigen Antibody         Combo, Treponema Abs w Reflex to RPR and Titer,        Hepatitis B surface antigen, Hepatitis B         Surface Antibody, Hepatitis C antibody, UA         Macroscopic with reflex to Microscopic and         Culture - Clinic Collect, Wet prep - Clinic         Collect, UA Microscopic with Reflex to Culture            (F32.A) Depression, unspecified depression type  Comment:   Plan: Keep follow up with your primary clinic on 4/18/24.    If you develop active thoughts of self harm and have a plan to harm yourself and means to carry out that plan or you feel that you are in a mental health crisis, please go to the nearest emergency room for immediate care. They have mental health teams that can help you.     Below are crisis hotline numbers to keep on hand and use if needed.  Here are some support lines you can call if you are having any thoughts of harming yourself.     Suicide and Crisis Lifeline: Call or text 579    National Hotline for Mental Health Crisis and Suicide Prevention 1-884-394-TALK (0712)    [facility] 24-Hour Crisis Counseling (for [facility] Residents Only) [phone]     [facility] 24/7 Suicide Crisis Hotline 454-625-6772     [facility] 24/7 Crisis Intervention Center- Suicide Hotline 321-919-8851, Crisis  "Referral Line 065-215-3888         [facility] Empath Program  [phone]     Kind regards,  Your [facility] Care Team    At the end of the encounter, I discussed results, diagnosis, medications. Discussed red flags for immediate return to clinic/ER, as well as indications for follow up if no improvement. Patient understood and agreed to plan. Patient was stable for discharge       41 minutes spent by me on the date of the encounter doing chart review, review of test results, interpretation of tests, patient visit, documentation, and discussion with other provider(s)       SUBJECTIVE:   Rachel Medrano is a 18 year old female who presents today for lower abdominal discomfort, vaginal discharge and request for STI screening.    Patient's chart reveals that she missed an appointment with her primary clinic this morning for follow-up on depression.  I brought this up with the patient, and she states that \"I am feeling fine since I got back from [place], that was how I was feeling before I went there\".    Patient denies any current suicidal ideation.  She does state that she was given the [facility] path telephone number by the nurse line today.    Patient Active Problem List   Diagnosis    Fe deficiency anemia    Head trauma         No past medical history on file.      Current Outpatient Medications   Medication Sig Dispense Refill    Multiple Vitamins-Iron (DAILY-MIGNON/IRON/BETA-CAROTENE) TABS TAKE 1 TABLET BY MOUTH DAILY. (Patient not taking: Reported on 10/19/2020) 30 tablet 7     Social History     Tobacco Use    Smoking status: Never Smoker    Smokeless tobacco: Never Used   Substance Use Topics    Alcohol use: Not on file     Family History   Problem Relation Age of Onset    Diabetes Mother     Diabetes Father          ROS:    10 point ROS of systems including Constitutional, Eyes, Respiratory, Cardiovascular, Gastroenterology, Genitourinary, Integumentary, Muscularskeletal, " Psychiatric ,neurological were all negative except for pertinent positives noted in my HPI       OBJECTIVE:  /72 (BP Location: Left arm, Patient Position: Sitting, Cuff Size: Adult Regular)   Pulse 82   Temp 98.7  F (37.1  C) (Tympanic)   Resp 12   LMP 04/11/2024   SpO2 99%   Physical Exam:  GENERAL APPEARANCE: healthy, alert and no distress  RESP: lungs clear to auscultation - no rales, rhonchi or wheezes  CV: regular rates and rhythm, normal S1 S2, no murmur noted  ABDOMEN:  soft, nontender, no HSM or masses and bowel sounds normal  GU_female: deferred, self wet prep obtained.  SKIN: no suspicious lesions or rashes    Results for orders placed or performed in visit on 04/16/24   UA Macroscopic with reflex to Microscopic and Culture - Clinic Collect     Status: Abnormal    Specimen: Urine, Midstream   Result Value Ref Range    Color Urine Yellow Colorless, Straw, Light Yellow, Yellow    Appearance Urine Slightly Cloudy (A) Clear    Glucose Urine Negative Negative mg/dL    Bilirubin Urine Small (A) Negative    Ketones Urine Negative Negative mg/dL    Specific Gravity Urine 1.025 1.003 - 1.035    Blood Urine Small (A) Negative    pH Urine 6.0 5.0 - 7.0    Protein Albumin Urine Negative Negative mg/dL    Urobilinogen Urine 0.2 0.2, 1.0 E.U./dL    Nitrite Urine Negative Negative    Leukocyte Esterase Urine Negative Negative   UA Microscopic with Reflex to Culture     Status: Abnormal   Result Value Ref Range    Bacteria Urine Moderate (A) None Seen /HPF    RBC Urine 0-2 0-2 /HPF /HPF    WBC Urine 0-5 0-5 /HPF /HPF    Squamous Epithelials Urine Moderate (A) None Seen /LPF    Mucus Urine Present (A) None Seen /LPF    Narrative    Urine Culture not indicated   Wet prep - Clinic Collect     Status: Abnormal    Specimen: Vagina; Swab   Result Value Ref Range    Trichomonas Absent Absent    Yeast Absent Absent    Clue Cells Present (A) Absent    WBCs/high power field 3+ (A) None

## 2024-04-16 NOTE — PATIENT INSTRUCTIONS
(N76.0,  B96.89) Bacterial vaginosis  (primary encounter diagnosis)  Comment:   Plan: metroNIDAZOLE (FLAGYL) 500 MG tablet            (Z11.3) Screening examination for STI  Comment:   Plan: Chlamydia trachomatis/Neisseria gonorrhoeae by         PCR - Clinic Collect, HIV Antigen Antibody         Combo, Treponema Abs w Reflex to RPR and Titer,        Hepatitis B surface antigen, Hepatitis B         Surface Antibody, Hepatitis C antibody, UA         Macroscopic with reflex to Microscopic and         Culture - Clinic Collect, Wet prep - Clinic         Collect, UA Microscopic with Reflex to Culture            (F32.A) Depression, unspecified depression type  Comment:   Plan: Keep follow up with your primary clinic on 4/18/24.    If you develop active thoughts of self harm and have a plan to harm yourself and means to carry out that plan or you feel that you are in a mental health crisis, please go to the nearest emergency room for immediate care. They have mental health teams that can help you.     Below are crisis hotline numbers to keep on hand and use if needed.  Here are some support lines you can call if you are having any thoughts of harming yourself.     Suicide and Crisis Lifeline: Call or text 799    National Hotline for Mental Health Crisis and Suicide Prevention 0-477-549-TALK (1506)    [facility] 24-Hour Crisis Counseling (for [facility] Residents Only) [phone]     [facility] 24/7 Suicide Crisis Hotline [phone]     [facility] ([facility]) 24/7 Crisis Intervention Center- Suicide Hotline 019-502-8125, Crisis Referral Line [phone]         [facility]  [phone]     Kind regards,  [facility] Care Team

## 2024-04-17 LAB
C TRACH DNA SPEC QL PROBE+SIG AMP: NEGATIVE
HBV SURFACE AB SERPL IA-ACNC: 8.05 M[IU]/ML
HBV SURFACE AB SERPL IA-ACNC: NONREACTIVE M[IU]/ML
HBV SURFACE AG SERPL QL IA: NONREACTIVE
HCV AB SERPL QL IA: NONREACTIVE
HIV 1+2 AB+HIV1 P24 AG SERPL QL IA: NONREACTIVE
N GONORRHOEA DNA SPEC QL NAA+PROBE: NEGATIVE

## 2024-04-18 ENCOUNTER — TELEPHONE (OUTPATIENT)
Dept: FAMILY MEDICINE | Facility: CLINIC | Age: 19
End: 2024-04-18

## 2024-04-18 DIAGNOSIS — H53.8 BLURRED VISION: Primary | ICD-10-CM

## 2024-04-18 NOTE — TELEPHONE ENCOUNTER
"Pt calling regarding UC visit on 4/16/24. States she thought she was going to get a medication prescribed for her stye in her right eye, but did not receive a prescription. Pt states her vision has been blurred for 3 weeks and is getting worse. Now stye is moving to her pupil. Has drainage that is clear-yellow in color. Has a \"bubble\" in the corner of her eye. States she has had an \"internal stye\" x 2 weeks and now has a bruise on her eyelid. Has tried warm compresses and this helps for the pain, but then returns 10 times worse. Pt requesting prescription for stye be sent to pharmacy cued.     Pt states she cannot take the pill form of Metronidazole. She vomits from this medication. Pt is requesting Metrogel instead. States she does have Metrogel on hand, but not enough for the course.     Routing to Provider to advise.    Deborah Valenzuela RN  [facility]    "

## 2024-04-21 NOTE — TELEPHONE ENCOUNTER
I call the patient regarding her eyes being blurry, she states that it was blurry because of the eye drop, plus the swelling and redness is gone,but she still wants to see a eye specialist.   She wants us to call and make her an appointment, she is ok if we send the appointment information to her my chart. There is already a referral in, she needs to be seen as soon as possible.   Richard Jose, CASSIDY

## 2024-04-22 NOTE — TELEPHONE ENCOUNTER
Sent [employer] message to patient re: Eye appt @  [facility] # [phone]. She is scheduled 4/23/2024 at the [place] location with Dr. Randhawa.  Teresa Jefferson LPN

## 2024-05-03 ENCOUNTER — MYC MEDICAL ADVICE (OUTPATIENT)
Dept: FAMILY MEDICINE | Facility: CLINIC | Age: 19
End: 2024-05-03
Payer: COMMERCIAL

## 2024-05-03 ENCOUNTER — TELEPHONE (OUTPATIENT)
Dept: FAMILY MEDICINE | Facility: CLINIC | Age: 19
End: 2024-05-03
Payer: COMMERCIAL

## 2024-05-03 NOTE — TELEPHONE ENCOUNTER
Pt calling to ask pcp to fill out form for pt to receive Cash benefits for EBT. RN sent pt [employer] message for pt to attach form to [employer] for team to print and place in pcp's basket.     Pt states she was seen 5/2/24 in ED and was given anxiety medications to try. Pt declines suicidal thoughts and has anxiety. Pt has appointment for 5/6/24 to discuss mental health. RN assisted pt in mental health follow up with pcp but advised pt to follow through with 5/6/24 appointment. Pt verbalized understanding.     Pt states that she was evaluated by [facility] in [facility] 2021: dx PTSD and BPD. Pt asking if this needs to be re done by [facility]. RN informed pt to have [facility] send their notes. Pt verbalized understanding and stated no further questions.     Jacquelin Harrell RN on 5/3/2024 at 4:53 PM

## 2024-05-06 ENCOUNTER — VIRTUAL VISIT (OUTPATIENT)
Dept: FAMILY MEDICINE | Facility: CLINIC | Age: 19
End: 2024-05-06
Payer: COMMERCIAL

## 2024-05-06 DIAGNOSIS — N76.0 BV (BACTERIAL VAGINOSIS): ICD-10-CM

## 2024-05-06 DIAGNOSIS — B96.89 BV (BACTERIAL VAGINOSIS): ICD-10-CM

## 2024-05-06 DIAGNOSIS — B00.9 HSV INFECTION: ICD-10-CM

## 2024-05-06 DIAGNOSIS — F41.9 ANXIETY: Primary | ICD-10-CM

## 2024-05-06 PROBLEM — T68.XXXA HYPOTHERMIA, INITIAL ENCOUNTER: Status: ACTIVE | Noted: 2024-05-02

## 2024-05-06 PROCEDURE — 99214 OFFICE O/P EST MOD 30 MIN: CPT | Mod: 95 | Performed by: PHYSICIAN ASSISTANT

## 2024-05-06 RX ORDER — VALACYCLOVIR HYDROCHLORIDE 1 G/1
2000 TABLET, FILM COATED ORAL 2 TIMES DAILY PRN
Qty: 28 TABLET | Refills: 0 | Status: SHIPPED | OUTPATIENT
Start: 2024-05-06 | End: 2024-07-05

## 2024-05-06 RX ORDER — CITALOPRAM HYDROBROMIDE 10 MG/1
TABLET ORAL
Qty: 74 TABLET | Refills: 0 | Status: SHIPPED | OUTPATIENT
Start: 2024-05-06 | End: 2024-06-19

## 2024-05-06 RX ORDER — METRONIDAZOLE 500 MG/1
500 TABLET ORAL 2 TIMES DAILY
Qty: 14 TABLET | Refills: 0 | Status: SHIPPED | OUTPATIENT
Start: 2024-05-06 | End: 2024-05-13

## 2024-05-06 RX ORDER — VALACYCLOVIR HYDROCHLORIDE 500 MG/1
500 TABLET, FILM COATED ORAL DAILY
Qty: 90 TABLET | Refills: 3 | Status: SHIPPED | OUTPATIENT
Start: 2024-05-06

## 2024-05-06 RX ORDER — HYDROXYZINE HYDROCHLORIDE 25 MG/1
25-50 TABLET, FILM COATED ORAL 3 TIMES DAILY PRN
Qty: 90 TABLET | Refills: 1 | Status: SHIPPED | OUTPATIENT
Start: 2024-05-06

## 2024-05-06 RX ORDER — HYDROXYZINE HYDROCHLORIDE 25 MG/1
25-50 TABLET, FILM COATED ORAL EVERY 4 HOURS PRN
COMMUNITY
Start: 2024-05-02

## 2024-05-06 ASSESSMENT — ANXIETY QUESTIONNAIRES
7. FEELING AFRAID AS IF SOMETHING AWFUL MIGHT HAPPEN: MORE THAN HALF THE DAYS
7. FEELING AFRAID AS IF SOMETHING AWFUL MIGHT HAPPEN: MORE THAN HALF THE DAYS
8. IF YOU CHECKED OFF ANY PROBLEMS, HOW DIFFICULT HAVE THESE MADE IT FOR YOU TO DO YOUR WORK, TAKE CARE OF THINGS AT HOME, OR GET ALONG WITH OTHER PEOPLE?: VERY DIFFICULT
GAD7 TOTAL SCORE: 19
IF YOU CHECKED OFF ANY PROBLEMS ON THIS QUESTIONNAIRE, HOW DIFFICULT HAVE THESE PROBLEMS MADE IT FOR YOU TO DO YOUR WORK, TAKE CARE OF THINGS AT HOME, OR GET ALONG WITH OTHER PEOPLE: VERY DIFFICULT
GAD7 TOTAL SCORE: 19
3. WORRYING TOO MUCH ABOUT DIFFERENT THINGS: NEARLY EVERY DAY
2. NOT BEING ABLE TO STOP OR CONTROL WORRYING: NEARLY EVERY DAY
4. TROUBLE RELAXING: NEARLY EVERY DAY
6. BECOMING EASILY ANNOYED OR IRRITABLE: NEARLY EVERY DAY
5. BEING SO RESTLESS THAT IT IS HARD TO SIT STILL: MORE THAN HALF THE DAYS
1. FEELING NERVOUS, ANXIOUS, OR ON EDGE: NEARLY EVERY DAY
GAD7 TOTAL SCORE: 19

## 2024-05-06 ASSESSMENT — PATIENT HEALTH QUESTIONNAIRE - PHQ9
10. IF YOU CHECKED OFF ANY PROBLEMS, HOW DIFFICULT HAVE THESE PROBLEMS MADE IT FOR YOU TO DO YOUR WORK, TAKE CARE OF THINGS AT HOME, OR GET ALONG WITH OTHER PEOPLE: VERY DIFFICULT
SUM OF ALL RESPONSES TO PHQ QUESTIONS 1-9: 13
SUM OF ALL RESPONSES TO PHQ QUESTIONS 1-9: 13

## 2024-05-06 NOTE — PROGRESS NOTES
"Rachel is a 18 year old who is being evaluated via a billable video visit.    How would you like to obtain your AVS? MyChart  If the video visit is dropped, the invitation should be resent by: Text to cell phone: [phone]  Will anyone else be joining your video visit? No      Assessment & Plan     Anxiety  Long standing, chronic, UNcontrolled- patient ready for daily medicine; options discussed with patient and prescription for celexa sent to pharmacy; as needed hydroxyzine sent to correct pharmacy as well; over the counter and supportive care discussed with patient and in patient instructions; follow up in 1 month or sooner with any worsening or changes in symptoms and follow up with PCP for routine care.    - hydrOXYzine HCl (ATARAX) 25 MG tablet; Take 1-2 tablets (25-50 mg) by mouth 3 times daily as needed for anxiety  - citalopram (CELEXA) 10 MG tablet; Take 1 tablet (10 mg) by mouth daily for 14 days, THEN 2 tablets (20 mg) daily for 30 days.    HSV infection  Long standing, chronic, controlled- regular refills sent to pharmacy.  - valACYclovir (VALTREX) 500 MG tablet; Take 1 tablet (500 mg) by mouth daily .  - valACYclovir (VALTREX) 1000 mg tablet; Take 2 tablets (2,000 mg) by mouth 2 times daily as needed (for outbreak)    BV (bacterial vaginosis)  - metroNIDAZOLE (FLAGYL) 500 MG tablet; Take 1 tablet (500 mg) by mouth 2 times daily for 7 days    Review of prior external note(s) from - previous routine PCP and recent acute care notes  20 minutes spent by me on the date of the encounter doing chart review, history and exam, documentation and further activities per the note      BMI  Estimated body mass index is 28.16 kg/m  as calculated from the following:    Height as of 9/12/23: 1.651 m (5' 5\").    Weight as of 1/3/24: 76.7 kg (169 lb 3.2 oz).       Depression Screening Follow Up        Follow Up Actions Taken  Crisis resource information provided in After Visit Summary  Referred patient back to current " mental health provider.       See Patient Instructions    Zeina Ramos is a 18 year old, presenting for the following health issues:  No chief complaint on file.      Video Start Time: 12:00 PM    History of Present Illness       Mental Health Follow-up:  Patient presents to follow-up on Depression & Anxiety.Patient's depression since last visit has been:  Medium  The patient is not having other symptoms associated with depression.  Patient's anxiety since last visit has been:  No change  The patient is having other symptoms associated with anxiety.  Any significant life events: other  Patient is feeling anxious or having panic attacks.  Patient has no concerns about alcohol or drug use.    She eats 0-1 servings of fruits and vegetables daily.She consumes 2 sweetened beverage(s) daily.She exercises with enough effort to increase her heart rate 30 to 60 minutes per day.  She exercises with enough effort to increase her heart rate 3 or less days per week.   She is taking medications regularly.     History of depression and anxiety for some time.  History of working with therapy/DBT.  Recent panic attack and seen in ED.  - hydroxyzine helped in ED.    Patient working on getting housing and food support through [employer] as she has not been working right now due to issues with mental health.  Hopes to be off work for the next 4 months or so given issues above.    Review of Systems  Constitutional, neuro, ENT, endocrine, pulmonary, cardiac, gastrointestinal, genitourinary, musculoskeletal, integument and psychiatric systems are negative, except as otherwise noted.      Objective           Vitals:  No vitals were obtained today due to virtual visit.    Physical Exam   GENERAL: alert and no distress  EYES: Eyes grossly normal to inspection.  No discharge or erythema, or obvious scleral/conjunctival abnormalities.  RESP: No audible wheeze, cough, or visible cyanosis.    SKIN: Visible skin clear. No significant rash,  abnormal pigmentation or lesions.  NEURO: Cranial nerves grossly intact.  Mentation and speech appropriate for age.  PSYCH: Appropriate affect, tone, and pace of words          Video-Visit Details    Type of service:  Video Visit   Video End Time:12:12 PM  Originating Location (pt. Location): Home    Distant Location (provider location):  Off-site  Platform used for Video Visit: Jeremias  Signed Electronically by: Jorge Aburto PA-C

## 2024-05-06 NOTE — PATIENT INSTRUCTIONS
"Discussed the pathophysiology of anxiety/depression episodes and the various symptoms seen associated with anxiety episodes. Discussed possible triggers including fatigue, depression, stress, and chemicals such as alcohol, caffeine and certain drugs. Discussed the treatment including an aerobic exercise program, adequate rest, and both rescue meds and maintenance meds.   For your anxiety:   1. Consider therapy - CBT - cognitive behavioral therapy  2. \"The Chemistry of Calm\" by Salvador Huang   3. \"Hope and Help for your Nerves\" by Naty Chi   4. Vitamin D 1703-7825 IU daily   5. Valerian root extract for relaxation and sleep OR Melatonin at bedtime.  Discussed multifaceted approach to controlling anxiety including self care, counseling, and medication.   Patient is interested in establishing with therapy and starting medication today. Will start celexa 10 mg and titrate up to 20 mg (two tabs at once). After 1-2 weeks if needed/tolerated.   Discussed side effects of SSRI including possible increase in suicide ideation in the first few weeks, pt knows to call crisis line or go to ER if this happens.  Discussed clinical effect often delayed until 4-6 weeks.   Discussed taking time for self and spending time with people who provide social support. Follow up in 1-2 months.     Patient to return to clinic in 1 month for follow up then 5-6 months for further refills or sooner with any worsening or changes in symptoms.        If you are experiencing a mental health crisis call the crisis response provider in your community :  Ted: Adult [phone] children [phone]  Malu GARCIA line: Adult [phone] children [phone]  In a life threatening emergency , call 911     Otherwise if need help for urgent mental health please go to the [facility] located at [facility].     Address: [address] [place] [address]  Phone: [phone] " [phone]    [facility] services.  [address]  [place] [address]

## 2024-05-07 NOTE — TELEPHONE ENCOUNTER
Forms faxed to [facility] fax # [phone] and copy sent to stat scan.     Syeda FELIZ

## 2024-05-07 NOTE — TELEPHONE ENCOUNTER
"Signed, please make a copy to scan into chart, will give to TC.  Thanks  Guerline \"Jorge\" CASSANDRA Aburto   "

## 2024-06-23 ENCOUNTER — HEALTH MAINTENANCE LETTER (OUTPATIENT)
Age: 19
End: 2024-06-23

## 2024-07-05 ENCOUNTER — MYC REFILL (OUTPATIENT)
Dept: FAMILY MEDICINE | Facility: CLINIC | Age: 19
End: 2024-07-05
Payer: COMMERCIAL

## 2024-07-05 DIAGNOSIS — B00.9 HSV INFECTION: ICD-10-CM

## 2024-07-05 RX ORDER — VALACYCLOVIR HYDROCHLORIDE 1 G/1
2000 TABLET, FILM COATED ORAL 2 TIMES DAILY PRN
Qty: 28 TABLET | Refills: 0 | Status: SHIPPED | OUTPATIENT
Start: 2024-07-05

## 2024-07-05 RX ORDER — VALACYCLOVIR HYDROCHLORIDE 500 MG/1
500 TABLET, FILM COATED ORAL DAILY
Qty: 90 TABLET | Refills: 3 | OUTPATIENT
Start: 2024-07-05

## 2024-07-17 ENCOUNTER — OFFICE VISIT (OUTPATIENT)
Dept: MIDWIFE SERVICES | Facility: CLINIC | Age: 19
End: 2024-07-17
Payer: COMMERCIAL

## 2024-07-17 VITALS
OXYGEN SATURATION: 99 % | TEMPERATURE: 98.3 F | DIASTOLIC BLOOD PRESSURE: 69 MMHG | BODY MASS INDEX: 27.92 KG/M2 | SYSTOLIC BLOOD PRESSURE: 107 MMHG | HEART RATE: 76 BPM | WEIGHT: 167.8 LBS

## 2024-07-17 DIAGNOSIS — Z30.09 BIRTH CONTROL COUNSELING: ICD-10-CM

## 2024-07-17 DIAGNOSIS — F43.10 PTSD (POST-TRAUMATIC STRESS DISORDER): ICD-10-CM

## 2024-07-17 DIAGNOSIS — F31.31 BIPOLAR AFFECTIVE DISORDER, CURRENTLY DEPRESSED, MILD (H): ICD-10-CM

## 2024-07-17 DIAGNOSIS — B00.9 HERPES SIMPLEX VIRUS INFECTION: ICD-10-CM

## 2024-07-17 DIAGNOSIS — Z11.3 SCREEN FOR STD (SEXUALLY TRANSMITTED DISEASE): Primary | ICD-10-CM

## 2024-07-17 LAB
CLUE CELLS: PRESENT
HCG UR QL: NEGATIVE
HCV AB SERPL QL IA: NONREACTIVE
HIV 1+2 AB+HIV1 P24 AG SERPL QL IA: NONREACTIVE
T PALLIDUM AB SER QL: NONREACTIVE
TRICHOMONAS, WET PREP: ABNORMAL
WBC'S/HIGH POWER FIELD, WET PREP: ABNORMAL
YEAST, WET PREP: ABNORMAL

## 2024-07-17 PROCEDURE — 99459 PELVIC EXAMINATION: CPT | Performed by: ADVANCED PRACTICE MIDWIFE

## 2024-07-17 PROCEDURE — 99213 OFFICE O/P EST LOW 20 MIN: CPT | Performed by: ADVANCED PRACTICE MIDWIFE

## 2024-07-17 PROCEDURE — 87591 N.GONORRHOEAE DNA AMP PROB: CPT | Performed by: ADVANCED PRACTICE MIDWIFE

## 2024-07-17 PROCEDURE — 87389 HIV-1 AG W/HIV-1&-2 AB AG IA: CPT | Performed by: ADVANCED PRACTICE MIDWIFE

## 2024-07-17 PROCEDURE — 81025 URINE PREGNANCY TEST: CPT | Performed by: ADVANCED PRACTICE MIDWIFE

## 2024-07-17 PROCEDURE — 87529 HSV DNA AMP PROBE: CPT | Performed by: ADVANCED PRACTICE MIDWIFE

## 2024-07-17 PROCEDURE — 86780 TREPONEMA PALLIDUM: CPT | Performed by: ADVANCED PRACTICE MIDWIFE

## 2024-07-17 PROCEDURE — 86803 HEPATITIS C AB TEST: CPT | Performed by: ADVANCED PRACTICE MIDWIFE

## 2024-07-17 PROCEDURE — 87210 SMEAR WET MOUNT SALINE/INK: CPT | Performed by: ADVANCED PRACTICE MIDWIFE

## 2024-07-17 PROCEDURE — 36415 COLL VENOUS BLD VENIPUNCTURE: CPT | Performed by: ADVANCED PRACTICE MIDWIFE

## 2024-07-17 PROCEDURE — 87491 CHLMYD TRACH DNA AMP PROBE: CPT | Performed by: ADVANCED PRACTICE MIDWIFE

## 2024-07-17 ASSESSMENT — ANXIETY QUESTIONNAIRES
3. WORRYING TOO MUCH ABOUT DIFFERENT THINGS: NOT AT ALL
IF YOU CHECKED OFF ANY PROBLEMS ON THIS QUESTIONNAIRE, HOW DIFFICULT HAVE THESE PROBLEMS MADE IT FOR YOU TO DO YOUR WORK, TAKE CARE OF THINGS AT HOME, OR GET ALONG WITH OTHER PEOPLE: NOT DIFFICULT AT ALL
5. BEING SO RESTLESS THAT IT IS HARD TO SIT STILL: NOT AT ALL
2. NOT BEING ABLE TO STOP OR CONTROL WORRYING: NOT AT ALL
7. FEELING AFRAID AS IF SOMETHING AWFUL MIGHT HAPPEN: NOT AT ALL
1. FEELING NERVOUS, ANXIOUS, OR ON EDGE: NOT AT ALL
6. BECOMING EASILY ANNOYED OR IRRITABLE: NOT AT ALL
GAD7 TOTAL SCORE: 1
GAD7 TOTAL SCORE: 1

## 2024-07-17 ASSESSMENT — PATIENT HEALTH QUESTIONNAIRE - PHQ9
SUM OF ALL RESPONSES TO PHQ QUESTIONS 1-9: 1
5. POOR APPETITE OR OVEREATING: SEVERAL DAYS

## 2024-07-17 NOTE — PROGRESS NOTES
SUBJECTIVE: 18 year old female presents for STI testing. She has a history of HSV and is on daily prophylaxis. She is unsure if she really has ever had an outbreak. Testing was done with IgG testing and not PCR testing on a lesion. She gets ingrown hairs that are not really painful. Unsure if she has gotten them confused with HSV lesions. We discussed what each of these lesions would look like. She has never had painful lesions which is why she is wondering if she actually has ever had an outbreak. She is positive in blood tests. We discussed some people have minimal to no outbreaks despite being carriers. She would like STI testing, she has unprotected sex. Not symptomatic, just routine testing. She is also thinking about an IUD. Not ready for it today but does want to go over birth control. Discussed options. She feels like the Mirena IUD would be the best option for her. No other questions or concerns today.   We reviewed her history briefly. She has a history of bipolar and PTSD. Interested in establishing a therapy team. Referral sent. No medications, does not feel like she needs them at this time.     Patient's last menstrual period was 07/13/2024.     Current Outpatient Medications   Medication Sig Dispense Refill    hydrOXYzine HCl (ATARAX) 25 MG tablet Take 1-2 tablets (25-50 mg) by mouth 3 times daily as needed for anxiety 90 tablet 1    valACYclovir (VALTREX) 1000 mg tablet Take 2 tablets (2,000 mg) by mouth 2 times daily as needed (for outbreak) 28 tablet 0    valACYclovir (VALTREX) 500 MG tablet Take 1 tablet (500 mg) by mouth daily . 90 tablet 3    citalopram (CELEXA) 10 MG tablet Take 1 tablet (10 mg) by mouth daily for 14 days, THEN 2 tablets (20 mg) daily for 30 days. 74 tablet 0    hydrOXYzine HCl (ATARAX) 25 MG tablet Take 25-50 mg by mouth every 4 hours as needed for anxiety (Patient not taking: Reported on 5/6/2024)       No current facility-administered medications for this visit.     No Known  Allergies  Social History     Tobacco Use    Smoking status: Never     Passive exposure: Never    Smokeless tobacco: Never    Tobacco comments:     [facility]    Substance Use Topics    Alcohol use: Yes     Comment: monthly         REVIEW OF SYSTEMS: Constitutional, HEENT, cardiovascular, pulmonary, gi and gu systems are negative, except as otherwise noted.  General medical, surgical, OB/Gyn and social histories   reviewed and updated in Histories section of [facility].     OBJECTIVE: /69   Pulse 76   Temp 98.3  F (36.8  C) (Oral)   Wt 76.1 kg (167 lb 12.8 oz)   LMP 07/13/2024   SpO2 99%   Breastfeeding No   BMI 27.92 kg/m    Patient appears well.    Pelvic exam: normal vagina and vulva, ingrown hair, small maybe 1/4 cm, no head developed, vaginal discharge described as red period blood.   WET PREP: negative for trichomonas    Gonorrhea/Chlamydia: pending at time of note    ASSESSMENT:   STI testing, routine.    (Z11.3) Screen for STD (sexually transmitted disease)  (primary encounter diagnosis)  Plan: Chlamydia trachomatis/Neisseria gonorrhoeae by         PCR, Hepatitis C antibody, HIV Antigen Antibody        Combo Cascade, Treponema Abs w Reflex to RPR         and Titer, Wet preparation    (B00.9) Herpes simplex virus infection  Comment: discussed lesion was likely ingrown hair. Lesion not really open so discussed even if HSV would likely not get a good enough sample. Patient preferred collection anyway just in case.   Plan: HSV Types 1 and 2 Qualitative PCR CSF    (F31.31) Bipolar affective disorder, currently depressed, mild (H)  Plan: Adult Mental Health  Referral    (F43.10) PTSD (post-traumatic stress disorder)  Plan: Adult Mental Health  Referral    (Z30.09) Birth control counseling  Plan: Planning IUD. Discussed easiest to place with period but can come anytime. Take Ibuprofen before. Use condoms until placement.     PLAN:  STD prevention discussed. Birth control needs  reviewed.    PAMELLA DavisM

## 2024-07-18 LAB
C TRACH DNA SPEC QL PROBE+SIG AMP: NEGATIVE
HSV1 DNA SPEC QL NAA+PROBE: NOT DETECTED
HSV2 DNA SPEC QL NAA+PROBE: NOT DETECTED
N GONORRHOEA DNA SPEC QL NAA+PROBE: NEGATIVE

## 2024-09-13 ENCOUNTER — NURSE TRIAGE (OUTPATIENT)
Dept: NURSING | Facility: CLINIC | Age: 19
End: 2024-09-13
Payer: COMMERCIAL
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
## 2024-09-13 NOTE — TELEPHONE ENCOUNTER
Writer called patient to check status, patient states that she was not planning to go to ED. Writer advised that due to protocol and reported symptoms and fall/dizziness this morning patient should be assessed in ED, patient is agreeable and states that she will have someone bring her to [facility] ED.    Closing encounter.    KYLEIGH Escobar RN  [facility]

## 2024-09-17 ENCOUNTER — TRANSCRIBE ORDERS (OUTPATIENT)
Dept: OTHER | Age: 19
End: 2024-09-17

## 2024-09-17 DIAGNOSIS — G93.9 LEFT FRONTAL LOBE LESION: Primary | ICD-10-CM

## 2024-10-03 NOTE — PROGRESS NOTES
Assessment & Plan     Vaginal odor  - Wet preparation    Screen for STD (sexually transmitted disease)  Await remaining results and treat as indicated.  - Wet preparation  - Chlamydia trachomatis PCR  - Neisseria gonorrhoeae PCR  - Hepatitis B surface antigen; Future  - HIV Antigen Antibody Combo Cascade; Future  - Treponema Abs w Reflex to RPR and Titer; Future  - Hepatitis C antibody; Future  - Hepatitis B surface antigen  - HIV Antigen Antibody Combo Cascade  - Treponema Abs w Reflex to RPR and Titer  - Hepatitis C antibody    Dysuria  UA neg  - UA Macroscopic with reflex to Microscopic and Culture - Lab Collect; Future  - UA Macroscopic with reflex to Microscopic and Culture - Lab Collect    Bacterial vaginosis  During visit, patient requested vaginal treatment instead of PO if BV present due to nausea with PO Flagyl. Prescription sent and patient notified with result note. To follow up as needed.  - metroNIDAZOLE (METROGEL) 0.75 % vaginal gel; Place 1 applicator (5 g) vaginally at bedtime for 5 days.      Zeina Ramos is a 18 year old, presenting for the following health issues:  Vaginal odor    HPI       Vaginal Symptoms  Onset/Duration: 1 week ago  Description:  Vaginal Discharge: YES  Itching (Pruritis): YES  Burning sensation:  YES  Odor: YES  Accompanying Signs & Symptoms:  Urinary symptoms: No  Abdominal pain: No  Fever: No  History:   Sexually active: YES  New Partner: No  Possibility of Pregnancy:  No  Recent antibiotic use: No  Previous vaginitis issues: YES  Precipitating or alleviating factors: None  Therapies tried and outcome: none    Patient has noted symptoms x 1 week. Initially was odor, but notes some increased thick vaginal discharge mixed in, darker in color, some vulvar itching now. Denies fever, nausea. Urine is cloudy, intermittent dysuria, frequency. Denies urinary urgency or hematuria. History of HSV, no current symptoms of outbreak. On suppressive Valtrex.  Requests additional  "STI screening today. No major changes in products she is using.   LMP 9/14/24. No current hormonal contraception.      Review of Systems  Constitutional, HEENT, cardiovascular, pulmonary, gi and gu systems are negative, except as otherwise noted.      Objective    /78 (BP Location: Right arm, Patient Position: Sitting, Cuff Size: Adult Regular)   Pulse 92   Ht 1.651 m (5' 5\")   Wt 75.8 kg (167 lb)   LMP 09/14/2024 (Exact Date)   SpO2 95%   BMI 27.79 kg/m    Body mass index is 27.79 kg/m .  Physical Exam   GENERAL: alert and no distress   (female): normal female external genitalia, normal urethral meatus , normal vaginal mucosa, vaginal discharge - copious, yellow, and malodorous, and normal cervix, adnexae, and uterus without masses.  MS: no gross musculoskeletal defects noted, no edema  SKIN: no suspicious lesions or rashes  PSYCH: mentation appears normal, affect normal/bright    Results for orders placed or performed in visit on 10/08/24 (from the past 24 hour(s))   Wet preparation    Specimen: Vagina; Swab   Result Value Ref Range    Trichomonas Absent Absent    Yeast Absent Absent    Clue Cells Present (A) Absent    WBCs/high power field 1+ (A) None   UA Macroscopic with reflex to Microscopic and Culture - Lab Collect    Specimen: Urine, Clean Catch   Result Value Ref Range    Color Urine Yellow Colorless, Straw, Light Yellow, Yellow    Appearance Urine Clear Clear    Glucose Urine Negative Negative mg/dL    Bilirubin Urine Negative Negative    Ketones Urine Negative Negative mg/dL    Specific Gravity Urine 1.025 1.003 - 1.035    Blood Urine Negative Negative    pH Urine 6.0 5.0 - 7.0    Protein Albumin Urine Negative Negative mg/dL    Urobilinogen Urine 0.2 0.2, 1.0 E.U./dL    Nitrite Urine Negative Negative    Leukocyte Esterase Urine Negative Negative    Narrative    Microscopic not indicated           Signed Electronically by: PAMELLA Nguyen CNP    "

## 2024-10-08 ENCOUNTER — OFFICE VISIT (OUTPATIENT)
Dept: OBGYN | Facility: CLINIC | Age: 19
End: 2024-10-08
Payer: COMMERCIAL

## 2024-10-08 VITALS
SYSTOLIC BLOOD PRESSURE: 114 MMHG | HEIGHT: 65 IN | BODY MASS INDEX: 27.82 KG/M2 | DIASTOLIC BLOOD PRESSURE: 78 MMHG | WEIGHT: 167 LBS | OXYGEN SATURATION: 95 % | HEART RATE: 92 BPM

## 2024-10-08 DIAGNOSIS — B96.89 BACTERIAL VAGINOSIS: Primary | ICD-10-CM

## 2024-10-08 DIAGNOSIS — N76.0 BACTERIAL VAGINOSIS: Primary | ICD-10-CM

## 2024-10-08 DIAGNOSIS — R30.0 DYSURIA: ICD-10-CM

## 2024-10-08 DIAGNOSIS — N89.8 VAGINAL ODOR: ICD-10-CM

## 2024-10-08 DIAGNOSIS — Z11.3 SCREEN FOR STD (SEXUALLY TRANSMITTED DISEASE): ICD-10-CM

## 2024-10-08 LAB
ALBUMIN UR-MCNC: NEGATIVE MG/DL
APPEARANCE UR: CLEAR
BILIRUB UR QL STRIP: NEGATIVE
CLUE CELLS: PRESENT
COLOR UR AUTO: YELLOW
GLUCOSE UR STRIP-MCNC: NEGATIVE MG/DL
HGB UR QL STRIP: NEGATIVE
KETONES UR STRIP-MCNC: NEGATIVE MG/DL
LEUKOCYTE ESTERASE UR QL STRIP: NEGATIVE
NITRATE UR QL: NEGATIVE
PH UR STRIP: 6 [PH] (ref 5–7)
SP GR UR STRIP: 1.02 (ref 1–1.03)
T PALLIDUM AB SER QL: NONREACTIVE
TRICHOMONAS, WET PREP: ABNORMAL
UROBILINOGEN UR STRIP-ACNC: 0.2 E.U./DL
WBC'S/HIGH POWER FIELD, WET PREP: ABNORMAL
YEAST, WET PREP: ABNORMAL

## 2024-10-08 PROCEDURE — 87491 CHLMYD TRACH DNA AMP PROBE: CPT | Performed by: NURSE PRACTITIONER

## 2024-10-08 PROCEDURE — 86803 HEPATITIS C AB TEST: CPT | Performed by: NURSE PRACTITIONER

## 2024-10-08 PROCEDURE — 99214 OFFICE O/P EST MOD 30 MIN: CPT | Performed by: NURSE PRACTITIONER

## 2024-10-08 PROCEDURE — 86780 TREPONEMA PALLIDUM: CPT | Performed by: NURSE PRACTITIONER

## 2024-10-08 PROCEDURE — 87210 SMEAR WET MOUNT SALINE/INK: CPT | Performed by: NURSE PRACTITIONER

## 2024-10-08 PROCEDURE — 87591 N.GONORRHOEAE DNA AMP PROB: CPT | Performed by: NURSE PRACTITIONER

## 2024-10-08 PROCEDURE — 87389 HIV-1 AG W/HIV-1&-2 AB AG IA: CPT | Performed by: NURSE PRACTITIONER

## 2024-10-08 PROCEDURE — 81003 URINALYSIS AUTO W/O SCOPE: CPT | Performed by: NURSE PRACTITIONER

## 2024-10-08 PROCEDURE — 87340 HEPATITIS B SURFACE AG IA: CPT | Performed by: NURSE PRACTITIONER

## 2024-10-08 PROCEDURE — 99459 PELVIC EXAMINATION: CPT | Performed by: NURSE PRACTITIONER

## 2024-10-08 PROCEDURE — 36415 COLL VENOUS BLD VENIPUNCTURE: CPT | Performed by: NURSE PRACTITIONER

## 2024-10-08 RX ORDER — METRONIDAZOLE 7.5 MG/G
1 GEL VAGINAL AT BEDTIME
Qty: 70 G | Refills: 0 | Status: SHIPPED | OUTPATIENT
Start: 2024-10-08 | End: 2024-10-13

## 2024-10-08 NOTE — PATIENT INSTRUCTIONS
If you have any questions regarding your visit, Please contact your care team.     [employer]: [phone]  To Schedule an Appointment 24/7  Call: [phone]     Yvette Zendejas- APRN CNP      [facility]-Sur[facility] Scheduler  [facility] Scheduler         Monday 7:30am-2:00pm    Tuesday 7:30am-4:00pm    Wednesday 7:30am-2:00pm    Thursday 7:30am-11:00am    Friday 7:30am-2:00pm [address] [address]  Phone- [phone]   Fax- [phone]     Imaging Scheduling all locations  [phone]    [facility]  [address] [place] [address]   [place] [address] [phone]         Urgent Care locations:  [facility]   Monday-Friday  10 am - 8 pm  Saturday and Sunday   9 am - 5 pm     [phone]   If you need a medication refill, please contact your pharmacy. Please allow 3 business days for your refill to be completed.  As always, Thank you for trusting us with your healthcare needs!      see additional instructions from your care team below

## 2024-10-09 LAB
C TRACH DNA SPEC QL NAA+PROBE: NEGATIVE
HBV SURFACE AG SERPL QL IA: NONREACTIVE
HCV AB SERPL QL IA: NONREACTIVE
HIV 1+2 AB+HIV1 P24 AG SERPL QL IA: NONREACTIVE
N GONORRHOEA DNA SPEC QL NAA+PROBE: NEGATIVE

## 2024-10-15 ENCOUNTER — PATIENT OUTREACH (OUTPATIENT)
Dept: CARE COORDINATION | Facility: CLINIC | Age: 19
End: 2024-10-15
Payer: COMMERCIAL

## 2024-12-11 ENCOUNTER — OFFICE VISIT (OUTPATIENT)
Dept: MIDWIFE SERVICES | Facility: CLINIC | Age: 19
End: 2024-12-11
Payer: COMMERCIAL

## 2024-12-11 VITALS
WEIGHT: 174 LBS | DIASTOLIC BLOOD PRESSURE: 85 MMHG | HEART RATE: 89 BPM | BODY MASS INDEX: 28.96 KG/M2 | TEMPERATURE: 97.3 F | SYSTOLIC BLOOD PRESSURE: 133 MMHG

## 2024-12-11 DIAGNOSIS — Z72.51 UNPROTECTED SEX: ICD-10-CM

## 2024-12-11 DIAGNOSIS — N76.0 BV (BACTERIAL VAGINOSIS): ICD-10-CM

## 2024-12-11 DIAGNOSIS — B96.89 BV (BACTERIAL VAGINOSIS): ICD-10-CM

## 2024-12-11 DIAGNOSIS — K62.9 ANAL LESION: ICD-10-CM

## 2024-12-11 DIAGNOSIS — B00.9 HSV INFECTION: Primary | ICD-10-CM

## 2024-12-11 DIAGNOSIS — Z11.3 SCREEN FOR STD (SEXUALLY TRANSMITTED DISEASE): ICD-10-CM

## 2024-12-11 LAB
CLUE CELLS: PRESENT
HCG INTACT+B SERPL-ACNC: <1 MIU/ML
HCV AB SERPL QL IA: NONREACTIVE
HIV 1+2 AB+HIV1 P24 AG SERPL QL IA: NONREACTIVE
T PALLIDUM AB SER QL: NONREACTIVE
TRICHOMONAS, WET PREP: ABNORMAL
WBC'S/HIGH POWER FIELD, WET PREP: ABNORMAL
YEAST, WET PREP: ABNORMAL

## 2024-12-11 PROCEDURE — 87591 N.GONORRHOEAE DNA AMP PROB: CPT | Performed by: ADVANCED PRACTICE MIDWIFE

## 2024-12-11 PROCEDURE — 99213 OFFICE O/P EST LOW 20 MIN: CPT | Performed by: ADVANCED PRACTICE MIDWIFE

## 2024-12-11 PROCEDURE — 86780 TREPONEMA PALLIDUM: CPT | Performed by: ADVANCED PRACTICE MIDWIFE

## 2024-12-11 PROCEDURE — 36415 COLL VENOUS BLD VENIPUNCTURE: CPT | Performed by: ADVANCED PRACTICE MIDWIFE

## 2024-12-11 PROCEDURE — 84702 CHORIONIC GONADOTROPIN TEST: CPT | Performed by: ADVANCED PRACTICE MIDWIFE

## 2024-12-11 PROCEDURE — 87389 HIV-1 AG W/HIV-1&-2 AB AG IA: CPT | Performed by: ADVANCED PRACTICE MIDWIFE

## 2024-12-11 PROCEDURE — 87210 SMEAR WET MOUNT SALINE/INK: CPT | Performed by: ADVANCED PRACTICE MIDWIFE

## 2024-12-11 PROCEDURE — 87529 HSV DNA AMP PROBE: CPT | Performed by: ADVANCED PRACTICE MIDWIFE

## 2024-12-11 PROCEDURE — 86803 HEPATITIS C AB TEST: CPT | Performed by: ADVANCED PRACTICE MIDWIFE

## 2024-12-11 PROCEDURE — 87491 CHLMYD TRACH DNA AMP PROBE: CPT | Performed by: ADVANCED PRACTICE MIDWIFE

## 2024-12-11 RX ORDER — VALACYCLOVIR HYDROCHLORIDE 1 G/1
1000 TABLET, FILM COATED ORAL DAILY
Qty: 90 TABLET | Refills: 1 | Status: SHIPPED | OUTPATIENT
Start: 2024-12-11

## 2024-12-11 RX ORDER — VALACYCLOVIR HYDROCHLORIDE 500 MG/1
500 TABLET, FILM COATED ORAL 2 TIMES DAILY
Qty: 6 TABLET | Refills: 0 | Status: SHIPPED | OUTPATIENT
Start: 2024-12-11 | End: 2024-12-14

## 2024-12-11 NOTE — PROGRESS NOTES
S:  Rachel Medrano is a 19 year old  who presents today for likely herpes outbreak. She was taking daily prophylaxis for presumed herpes outbreak but decided to stop since it was never really confirmed to be herpes via a lesion, just the blood draw. She recently developed some lesions around her anus. She feels like that might have been where the lesions were before that they thought were herpes. She would like testing today to see if that is what it is. She wants to be very mindful about not passing HSV along to her partners. She would like an RX for Valtrex, new RX sent, her pharmacy where they were at is closed today and she would like to get it started. She noted some lymph nodes which she feels is likely related to HSV.   She also noted that last month after taking Plan B she had bleeding for about 3 weeks. It started off like her period after the Plan B. She had a small break in the bleeding and then it returned and was dark coffee ground like. We discussed that abnormal bleeding is very common after Plan B. She should continue to get her periods normally.   She also reports her stomach was uncomfortable the last few days. She noted some movement in her stomach around the umbilicus and on the left side. She is constipated. We discussed likely gas pain and movement. She has Miralax at home, recommended that. Discussed Senna or Colace as a stool softener as well. Discussed foods and hydration to help maintain normal bowel movements. She was eating healthier but recently started eating more fast food so likely contributing to constipation.   We discussed vaginal health and bacterial vaginosis. She gets frequent bacterial vaginosis, info sent via [facility].   She would like a pregnancy test. She is having unprotected sex with her boyfriend and has one other partner. She would like STI testing as well. Her two partners have been testing also and are negative for infections.   No other questions or concerns  today.     O:  /85   Pulse 89   Temp 97.3  F (36.3  C)   Wt 78.9 kg (174 lb)   BMI 28.96 kg/m    Patient is well  Pelvic exam: normal vagina and vulva, vaginal discharge described as white, large amount of discharge in vagina and externally as well. 3 small lesions around the anus, two on the right side near about 9 o'clock and at right at 12 o'clock. All lesions swabbed. Painful to swab. Lesions not bleeding. No discharge or scabbing/crust noted.    Wet prep: clue cells     A:  Suspect HSV infection   bacterial vaginosis     P:  (B00.9) HSV infection  (primary encounter diagnosis)  Comment: Discussed timing of when to avoid sex to help from spreading. Discussed treatment. RX sent.   Plan: valACYclovir (VALTREX) 1000 mg tablet,         valACYclovir (VALTREX) 500 MG tablet    (Z11.3) Screen for STD (sexually transmitted disease)  Plan: HIV Antigen Antibody Combo Cascade, Treponema         Abs w Reflex to RPR and Titer, Wet preparation,        Hepatitis C antibody, Chlamydia         trachomatis/Neisseria gonorrhoeae by PCR    (K62.9) Anal lesion  Plan: HSV Types 1 and 2 Qualitative PCR CSF    (Z72.51) Unprotected sex  Plan: hCG Quantitative Pregnancy    (N76.0,  B96.89) BV (bacterial vaginosis)  Plan: She reports still having some treatment at home. Discussed vaginal health and mychart message sent to try and help prevent them. She gets them recurrently. She has tried boric acid in the past but it would cause bleeding so does not really like that option.     PAMELLA Davis CNM

## 2024-12-12 LAB
C TRACH DNA SPEC QL PROBE+SIG AMP: NEGATIVE
HSV1 DNA SPEC QL NAA+PROBE: DETECTED
HSV2 DNA SPEC QL NAA+PROBE: NOT DETECTED
N GONORRHOEA DNA SPEC QL NAA+PROBE: NEGATIVE
SPECIMEN TYPE: ABNORMAL

## 2025-03-05 ENCOUNTER — OFFICE VISIT (OUTPATIENT)
Dept: DERMATOLOGY | Facility: CLINIC | Age: 20
End: 2025-03-05
Payer: COMMERCIAL

## 2025-03-05 DIAGNOSIS — L70.8 OTHER ACNE: Primary | ICD-10-CM

## 2025-03-05 DIAGNOSIS — L81.9 POST-INFLAMMATORY PIGMENTARY CHANGES: ICD-10-CM

## 2025-03-05 PROCEDURE — 99204 OFFICE O/P NEW MOD 45 MIN: CPT | Performed by: STUDENT IN AN ORGANIZED HEALTH CARE EDUCATION/TRAINING PROGRAM

## 2025-03-05 PROCEDURE — 1126F AMNT PAIN NOTED NONE PRSNT: CPT | Performed by: STUDENT IN AN ORGANIZED HEALTH CARE EDUCATION/TRAINING PROGRAM

## 2025-03-05 RX ORDER — TRETINOIN 0.25 MG/G
CREAM TOPICAL
Qty: 45 G | Refills: 4 | Status: SHIPPED | OUTPATIENT
Start: 2025-03-05

## 2025-03-05 RX ORDER — CLINDAMYCIN PHOSPHATE 10 UG/ML
LOTION TOPICAL 2 TIMES DAILY
Qty: 60 ML | Refills: 3 | Status: SHIPPED | OUTPATIENT
Start: 2025-03-05

## 2025-03-05 ASSESSMENT — PAIN SCALES - GENERAL: PAINLEVEL_OUTOF10: NO PAIN (0)

## 2025-03-05 NOTE — PROGRESS NOTES
[facility] Dermatology Note    Encounter Date: Mar 5, 2025    Dermatology Problem List:    ______________________________________    Impression/Plan:  Rachel was seen today for derm problem.    Diagnoses and all orders for this visit:    Other acne (Chronic flaring)  PIPA  -     tretinoin (RETIN-A) 0.025 % external cream; Use every night  -     clindamycin (CLEOCIN T) 1 % external lotion; Apply topically 2 times daily.  - start tretinoin for PIPA and acne  - start clinda lotion to reduce purging  - PIPA on L sided smile lines, R cheek (might be pigmentary demarcation lines)      Follow-up in 3-4 mo.       Staff Involved:  Staff Only    Adná Yan MD   of Dermatology  Department of Dermatology  [facility]      CC:   Chief Complaint   Patient presents with    Derm Problem     - small bumps on face, present for 2 months, asymptomatic, patient using Cetaphil on face  - dark spots under eyes and by mouth, present for years       History of Present Illness:  Ms. Rachel Medrano is a 19 year old female who presents as a new patient.    Pt presents today for concerns about spots on trunk and extremities       Labs:      Physical exam:  Vitals: There were no vitals taken for this visit.  GEN: well developed, well-nourished, in no acute distress, in a pleasant mood.     SKIN: Madison phototype 4  - Acne exam, which includes the face, neck, upper central chest, and upper central back was performed.  - pink papules and pustules on face and back  - No other lesions of concern on areas examined.     Past Medical History:   Past Medical History:   Diagnosis Date    Bipolar disorder (H)     Herpes simplex virus infection     PTSD (post-traumatic stress disorder)     TBI (traumatic brain injury) (H) 2008     No past surgical history on file.    Social History:   reports that she has never smoked. She has never been exposed to tobacco smoke. She uses  smokeless tobacco. She reports current alcohol use. She reports current drug use. Drug: Marijuana.    Family History:  Family History   Problem Relation Age of Onset    Post-Traumatic Stress Disorder (PTSD) Father        Medications:  Current Outpatient Medications   Medication Sig Dispense Refill    clindamycin (CLEOCIN T) 1 % external lotion Apply topically 2 times daily. 60 mL 3    hydrOXYzine HCl (ATARAX) 25 MG tablet Take 1-2 tablets (25-50 mg) by mouth 3 times daily as needed for anxiety 90 tablet 1    tretinoin (RETIN-A) 0.025 % external cream Use every night 45 g 4    valACYclovir (VALTREX) 1000 mg tablet Take 1 tablet (1,000 mg) by mouth daily. (Patient taking differently: Take 1,000 mg by mouth as needed.) 90 tablet 1    citalopram (CELEXA) 10 MG tablet Take 1 tablet (10 mg) by mouth daily for 14 days, THEN 2 tablets (20 mg) daily for 30 days. 74 tablet 0    hydrOXYzine HCl (ATARAX) 25 MG tablet Take 25-50 mg by mouth every 4 hours as needed for anxiety (Patient not taking: Reported on 12/11/2024)      valACYclovir (VALTREX) 500 MG tablet Take 1 tablet (500 mg) by mouth 2 times daily for 3 days. 6 tablet 0     No Known Allergies

## 2025-03-05 NOTE — LETTER
3/5/2025      Rachel Medrano  [address] [address] [id number]  [place] [address]      Dear Colleague,    Thank you for referring your patient, Rachel Medrano, to the [facility]. Please see a copy of my visit note below.    [facility] Dermatology Note    Encounter Date: Mar 5, 2025    Dermatology Problem List:    ______________________________________    Impression/Plan:  Rachel was seen today for derm problem.    Diagnoses and all orders for this visit:    Other acne (Chronic flaring)  PIPA  -     tretinoin (RETIN-A) 0.025 % external cream; Use every night  -     clindamycin (CLEOCIN T) 1 % external lotion; Apply topically 2 times daily.  - start tretinoin for PIPA and acne  - start clinda lotion to reduce purging  - PIPA on L sided smile lines, R cheek (might be pigmentary demarcation lines)      Follow-up in 3-4 mo.       Staff Involved:  Staff Only    Adán Yan MD   of Dermatology  Department of Dermatology  [facility]      CC:   Chief Complaint   Patient presents with     Derm Problem     - small bumps on face, present for 2 months, asymptomatic, patient using Cetaphil on face  - dark spots under eyes and by mouth, present for years       History of Present Illness:  Ms. Rachel Medrano is a 19 year old female who presents as a new patient.    Pt presents today for concerns about spots on trunk and extremities       Labs:      Physical exam:  Vitals: There were no vitals taken for this visit.  GEN: well developed, well-nourished, in no acute distress, in a pleasant mood.     SKIN: Madison phototype 4  - Acne exam, which includes the face, neck, upper central chest, and upper central back was performed.  - pink papules and pustules on face and back  - No other lesions of concern on areas examined.     Past Medical History:   Past Medical History:   Diagnosis Date     Bipolar disorder (H)      Herpes simplex  virus infection      PTSD (post-traumatic stress disorder)      TBI (traumatic brain injury) (H) 2008     No past surgical history on file.    Social History:   reports that she has never smoked. She has never been exposed to tobacco smoke. She uses smokeless tobacco. She reports current alcohol use. She reports current drug use. Drug: Marijuana.    Family History:  Family History   Problem Relation Age of Onset     Post-Traumatic Stress Disorder (PTSD) Father        Medications:  Current Outpatient Medications   Medication Sig Dispense Refill     clindamycin (CLEOCIN T) 1 % external lotion Apply topically 2 times daily. 60 mL 3     hydrOXYzine HCl (ATARAX) 25 MG tablet Take 1-2 tablets (25-50 mg) by mouth 3 times daily as needed for anxiety 90 tablet 1     tretinoin (RETIN-A) 0.025 % external cream Use every night 45 g 4     valACYclovir (VALTREX) 1000 mg tablet Take 1 tablet (1,000 mg) by mouth daily. (Patient taking differently: Take 1,000 mg by mouth as needed.) 90 tablet 1     citalopram (CELEXA) 10 MG tablet Take 1 tablet (10 mg) by mouth daily for 14 days, THEN 2 tablets (20 mg) daily for 30 days. 74 tablet 0     hydrOXYzine HCl (ATARAX) 25 MG tablet Take 25-50 mg by mouth every 4 hours as needed for anxiety (Patient not taking: Reported on 12/11/2024)       valACYclovir (VALTREX) 500 MG tablet Take 1 tablet (500 mg) by mouth 2 times daily for 3 days. 6 tablet 0     No Known Allergies              Again, thank you for allowing me to participate in the care of your patient.        Sincerely,        Adán Yan MD    Electronically signed

## 2025-03-05 NOTE — PATIENT INSTRUCTIONS
"Apply clindamycin lotion on face every morning    Apply tretinoin to face nightly    RETINOIDS AND RELATED PRODUCTS   Tazorac - Tazarotene   Differin - Adapalene   Retin A / Retin A Micro / [employer] - Tretinoin     This topical medication helps treat and prevent acne. Your acne may become worse before it gets better.  This is normal and will stabilize over time.   - Use only a pea size amount for the entire face.    - Do not use only for spot treatment.   - Stop all other toners, cleansers, scrubs, and acne products that are not prescribed by your doctor.   - Wash your face with a mild soap such as Dove for sensitive skin, Cetaphil, Purpose, or Oil of Olay.      It is best if you wait until the skin is completely dry before applying the medication.     WHEN TO USE:   - Week 1 use only Monday and Friday   - Week 2 use every other day   - Week 3 use every day only if you are able to tolerate the medication.      Otherwise continue using every other day. You can expect some peeling / flaking but your skin should not be irritated. If irritation occurs use the medication less often.  These medications are strong.Using them as often as possible is ideal, but even three times per week  can be sufficient. Use a non-comedogenic moisturizer (Cetaphil, Purpose, Oil of Olay, or Neutrogena) in the morning and / or night.     Stop the medication if you become pregnant.     Be cautious with waxing (upper lip / eyebrows) when on these products - skin may be more sensitive.  You may need to discontinue them a week or more  before such treatments.       Be patient.  It may take as long as 3 months before you notice improvement. Do not stop medication unless instructed to do so. Stopping the medication because \"it doesn't work\" will end up delaying treatment in the long run.    "

## 2025-04-21 DIAGNOSIS — B00.9 HSV INFECTION: ICD-10-CM

## 2025-04-21 RX ORDER — VALACYCLOVIR HYDROCHLORIDE 500 MG/1
500 TABLET, FILM COATED ORAL 2 TIMES DAILY
Qty: 6 TABLET | Refills: 0 | Status: SHIPPED | OUTPATIENT
Start: 2025-04-21

## 2025-07-01 ENCOUNTER — MYC REFILL (OUTPATIENT)
Dept: FAMILY MEDICINE | Facility: CLINIC | Age: 20
End: 2025-07-01
Payer: COMMERCIAL

## 2025-07-01 DIAGNOSIS — B00.9 HSV INFECTION: ICD-10-CM

## 2025-07-01 RX ORDER — VALACYCLOVIR HYDROCHLORIDE 1 G/1
1000 TABLET, FILM COATED ORAL DAILY
Qty: 90 TABLET | Refills: 1 | Status: SHIPPED | OUTPATIENT
Start: 2025-07-01

## 2025-07-01 NOTE — TELEPHONE ENCOUNTER
"Can you check how patient if taking medication?  Daily or just more flares than once in a while    Thanks  Guerline \"Jorge\" CASSANDRA Aburto   "

## 2025-07-01 NOTE — TELEPHONE ENCOUNTER
Left non detailed voicemail for patient to return call to [facility]  Thanks,  Brenda BOSE RN

## 2025-07-01 NOTE — TELEPHONE ENCOUNTER
Would like to be on daily 1000mg daily states it works better. On the 500mg daily has been having more outbreaks.    Eulalia Hermosillo RN on 7/1/2025 at 3:26 PM

## 2025-07-12 ENCOUNTER — HEALTH MAINTENANCE LETTER (OUTPATIENT)
Age: 20
End: 2025-07-12

## 2025-08-21 ENCOUNTER — OFFICE VISIT (OUTPATIENT)
Dept: MIDWIFE SERVICES | Facility: CLINIC | Age: 20
End: 2025-08-21
Payer: COMMERCIAL

## 2025-08-21 VITALS
HEIGHT: 66 IN | BODY MASS INDEX: 28.45 KG/M2 | SYSTOLIC BLOOD PRESSURE: 100 MMHG | DIASTOLIC BLOOD PRESSURE: 62 MMHG | WEIGHT: 177 LBS

## 2025-08-21 DIAGNOSIS — Z01.812 PRE-PROCEDURE LAB EXAM: Primary | ICD-10-CM

## 2025-08-21 DIAGNOSIS — Z11.4 SCREENING FOR HIV (HUMAN IMMUNODEFICIENCY VIRUS): ICD-10-CM

## 2025-08-21 DIAGNOSIS — Z30.430 ENCOUNTER FOR IUD INSERTION: ICD-10-CM

## 2025-08-21 DIAGNOSIS — Z53.8 UNSUCCESSFUL IUD INSERTION: ICD-10-CM

## 2025-08-21 DIAGNOSIS — R20.0 ANALGESIA: ICD-10-CM

## 2025-08-21 DIAGNOSIS — Z11.3 SCREEN FOR STD (SEXUALLY TRANSMITTED DISEASE): ICD-10-CM

## 2025-08-21 LAB — HCG UR QL: NEGATIVE

## 2025-08-21 RX ORDER — MISOPROSTOL 200 UG/1
400 TABLET ORAL ONCE
Qty: 2 TABLET | Refills: 0 | Status: SHIPPED | OUTPATIENT
Start: 2025-08-21 | End: 2025-08-21

## 2025-08-21 RX ORDER — LIDOCAINE HYDROCHLORIDE 20 MG/ML
JELLY TOPICAL ONCE
Status: ACTIVE | OUTPATIENT
Start: 2025-08-21